# Patient Record
Sex: MALE | Race: WHITE | NOT HISPANIC OR LATINO
[De-identification: names, ages, dates, MRNs, and addresses within clinical notes are randomized per-mention and may not be internally consistent; named-entity substitution may affect disease eponyms.]

---

## 2021-03-18 PROBLEM — Z00.00 ENCOUNTER FOR PREVENTIVE HEALTH EXAMINATION: Status: ACTIVE | Noted: 2021-03-18

## 2021-03-19 ENCOUNTER — APPOINTMENT (OUTPATIENT)
Dept: ELECTROPHYSIOLOGY | Facility: CLINIC | Age: 56
End: 2021-03-19
Payer: MEDICARE

## 2021-03-19 DIAGNOSIS — T82.190A OTHER MECHANICAL COMPLICATION OF CARDIAC ELECTRODE, INITIAL ENCOUNTER: ICD-10-CM

## 2021-03-19 DIAGNOSIS — Z86.73 PERSONAL HISTORY OF TRANSIENT ISCHEMIC ATTACK (TIA), AND CEREBRAL INFARCTION W/OUT RESIDUAL DEFICITS: ICD-10-CM

## 2021-03-19 DIAGNOSIS — Z95.810 PRESENCE OF AUTOMATIC (IMPLANTABLE) CARDIAC DEFIBRILLATOR: ICD-10-CM

## 2021-03-19 DIAGNOSIS — I21.9 ACUTE MYOCARDIAL INFARCTION, UNSPECIFIED: ICD-10-CM

## 2021-03-19 DIAGNOSIS — I25.5 ISCHEMIC CARDIOMYOPATHY: ICD-10-CM

## 2021-03-19 PROCEDURE — 99203 OFFICE O/P NEW LOW 30 MIN: CPT | Mod: 95

## 2021-03-19 NOTE — REASON FOR VISIT
[Home] : at home, [unfilled] , at the time of the visit. [Medical Office: ([facility])___] : at the medical office located in  [Verbal consent obtained from patient] : the patient, [unfilled] [Initial Evaluation] : an initial evaluation of [FreeTextEntry1] : Ischemic CM and ICD lead fracture

## 2021-03-19 NOTE — HISTORY OF PRESENT ILLNESS
[FreeTextEntry1] : I had the pleasure of seeing your patient, Graham Walker today in the arrhythmia clinic of [facility] via telehealth.  As you well know the patient is a 56-year-old gentleman with a history of hyperlipidemia and homocystinemia premature coronary disease.  He suffered a large myocardial infarction at age 28 underwent a PCI.  Subsequently has myopathy and questionable heart failure.  In addition he has diabetes, TIAs and COPD.  In 2012 MUGA scan demonstrated an EF of 26% and he underwent placement of a single-chamber Medtronic primary prevention ICD.  Over the years on echocardiogram in 2014, 15, 17, 20 his ejection fraction has remained in the 30 to 35% range.  His most recent stress test in November 2020 demonstrated fixed anterior, apical and inferior defects without significant ischemia.  He suffered an ICD lead dislodgment and underwent extraction with reimplantation of a dual-chamber device in 2017.  The patient was septic gallbladder disease in October 2020 and received his first appropriate shocks from his device.  He was started on amiodarone at that time.  Follow-up his device was beeping and he was found to have an elevated of bipolar impedance of over 3000.  There was also some noise on the lead.  He was reconfigured to the tip to coil configuration and referred for potential extraction.\par \par Implanted hardware:\par ICD: Medtronic DVVE0D1, serial number [id number], implanted 6/16/2017\par ICD Lead: Medtronic 6935, serial number [id number]\par A lead; Medtronic 4076, serial number [id number]\par \par

## 2021-03-19 NOTE — DISCUSSION/SUMMARY
[FreeTextEntry1] : In summary the patient is a 56-year-old gentleman with premature coronary disease and ischemic cardiomyopathy status post ICD placement.  He now has an ICD lead fracture.The potential treatment options were reviewed at length.  If the vein were patent we discussed the option of implanting a new lead and abandoning malfunctioning lead.  Alternatively the device could be moved to the contralateral side with abandonment of the current system.  Lastly we discussed extraction and reimplantation.  The pros and cons of each of these approaches were discussed at length.  The procedure, outcomes and risks of extraction were discussed.  The risks discussed included, but were not limited to bleeding, infection, AV fistula, vascular tear, cardiac tear, valvular damage, need for emergent surgery and death.  After all questions were answered in a shared decision-making process the patient has decided to proceed with extraction and reimplantation.\par

## 2021-03-25 ENCOUNTER — NON-APPOINTMENT (OUTPATIENT)
Age: 56
End: 2021-03-25

## 2021-03-29 ENCOUNTER — TRANSCRIPTION ENCOUNTER (OUTPATIENT)
Age: 56
End: 2021-03-29

## 2021-03-29 ENCOUNTER — OUTPATIENT (OUTPATIENT)
Dept: OUTPATIENT SERVICES | Facility: HOSPITAL | Age: 56
LOS: 1 days | End: 2021-03-29
Payer: MEDICARE

## 2021-03-29 ENCOUNTER — OUTPATIENT (OUTPATIENT)
Dept: OUTPATIENT SERVICES | Facility: HOSPITAL | Age: 56
LOS: 1 days | End: 2021-03-29

## 2021-03-29 VITALS
DIASTOLIC BLOOD PRESSURE: 69 MMHG | RESPIRATION RATE: 20 BRPM | TEMPERATURE: 98 F | HEART RATE: 76 BPM | WEIGHT: 255.07 LBS | OXYGEN SATURATION: 96 % | SYSTOLIC BLOOD PRESSURE: 102 MMHG | HEIGHT: 66 IN

## 2021-03-29 VITALS
TEMPERATURE: 98 F | HEIGHT: 66 IN | DIASTOLIC BLOOD PRESSURE: 69 MMHG | OXYGEN SATURATION: 96 % | RESPIRATION RATE: 20 BRPM | WEIGHT: 255.07 LBS | SYSTOLIC BLOOD PRESSURE: 102 MMHG | HEART RATE: 76 BPM

## 2021-03-29 DIAGNOSIS — Z90.49 ACQUIRED ABSENCE OF OTHER SPECIFIED PARTS OF DIGESTIVE TRACT: Chronic | ICD-10-CM

## 2021-03-29 DIAGNOSIS — Z11.52 ENCOUNTER FOR SCREENING FOR COVID-19: ICD-10-CM

## 2021-03-29 DIAGNOSIS — E11.9 TYPE 2 DIABETES MELLITUS WITHOUT COMPLICATIONS: ICD-10-CM

## 2021-03-29 DIAGNOSIS — I47.0 RE-ENTRY VENTRICULAR ARRHYTHMIA: ICD-10-CM

## 2021-03-29 DIAGNOSIS — G47.33 OBSTRUCTIVE SLEEP APNEA (ADULT) (PEDIATRIC): ICD-10-CM

## 2021-03-29 DIAGNOSIS — T82.110A BREAKDOWN (MECHANICAL) OF CARDIAC ELECTRODE, INITIAL ENCOUNTER: ICD-10-CM

## 2021-03-29 DIAGNOSIS — I25.5 ISCHEMIC CARDIOMYOPATHY: ICD-10-CM

## 2021-03-29 DIAGNOSIS — Z98.890 OTHER SPECIFIED POSTPROCEDURAL STATES: Chronic | ICD-10-CM

## 2021-03-29 DIAGNOSIS — Z95.810 PRESENCE OF AUTOMATIC (IMPLANTABLE) CARDIAC DEFIBRILLATOR: Chronic | ICD-10-CM

## 2021-03-29 DIAGNOSIS — Z01.818 ENCOUNTER FOR OTHER PREPROCEDURAL EXAMINATION: ICD-10-CM

## 2021-03-29 LAB
ANION GAP SERPL CALC-SCNC: 14 MMOL/L — SIGNIFICANT CHANGE UP (ref 5–17)
BLD GP AB SCN SERPL QL: NEGATIVE — SIGNIFICANT CHANGE UP
BUN SERPL-MCNC: 24 MG/DL — HIGH (ref 7–23)
CALCIUM SERPL-MCNC: 9.4 MG/DL — SIGNIFICANT CHANGE UP (ref 8.4–10.5)
CHLORIDE SERPL-SCNC: 101 MMOL/L — SIGNIFICANT CHANGE UP (ref 96–108)
CO2 SERPL-SCNC: 23 MMOL/L — SIGNIFICANT CHANGE UP (ref 22–31)
CREAT SERPL-MCNC: 1.31 MG/DL — HIGH (ref 0.5–1.3)
GLUCOSE SERPL-MCNC: 120 MG/DL — HIGH (ref 70–99)
HCT VFR BLD CALC: 41.5 % — SIGNIFICANT CHANGE UP (ref 39–50)
HGB BLD-MCNC: 13.4 G/DL — SIGNIFICANT CHANGE UP (ref 13–17)
MCHC RBC-ENTMCNC: 27.6 PG — SIGNIFICANT CHANGE UP (ref 27–34)
MCHC RBC-ENTMCNC: 32.3 GM/DL — SIGNIFICANT CHANGE UP (ref 32–36)
MCV RBC AUTO: 85.6 FL — SIGNIFICANT CHANGE UP (ref 80–100)
NRBC # BLD: 0 /100 WBCS — SIGNIFICANT CHANGE UP (ref 0–0)
PLATELET # BLD AUTO: 173 K/UL — SIGNIFICANT CHANGE UP (ref 150–400)
POTASSIUM SERPL-MCNC: 3.7 MMOL/L — SIGNIFICANT CHANGE UP (ref 3.5–5.3)
POTASSIUM SERPL-SCNC: 3.7 MMOL/L — SIGNIFICANT CHANGE UP (ref 3.5–5.3)
RBC # BLD: 4.85 M/UL — SIGNIFICANT CHANGE UP (ref 4.2–5.8)
RBC # FLD: 14.5 % — SIGNIFICANT CHANGE UP (ref 10.3–14.5)
RH IG SCN BLD-IMP: NEGATIVE — SIGNIFICANT CHANGE UP
SARS-COV-2 RNA SPEC QL NAA+PROBE: DETECTED
SODIUM SERPL-SCNC: 138 MMOL/L — SIGNIFICANT CHANGE UP (ref 135–145)
WBC # BLD: 3.45 K/UL — LOW (ref 3.8–10.5)
WBC # FLD AUTO: 3.45 K/UL — LOW (ref 3.8–10.5)

## 2021-03-29 PROCEDURE — 71046 X-RAY EXAM CHEST 2 VIEWS: CPT | Mod: 26

## 2021-03-29 RX ORDER — CEFUROXIME AXETIL 250 MG
1500 TABLET ORAL ONCE
Refills: 0 | Status: DISCONTINUED | OUTPATIENT
Start: 2021-03-30 | End: 2021-04-01

## 2021-03-29 NOTE — H&P PST ADULT - HISTORY OF PRESENT ILLNESS
56-year-old gentleman poor historian  with a history of  Morbid obesity , HTN ,DM type 2, hyperlipidemia and homocystinemia , premature coronary disease,  myocardial infarction at age 28 underwent a PCI ( stents ), TIAs and COPD, cardiomyopathy , CHF s/p cardiac pacemaker in 2012 and  extraction with reimplantation of a dual-chamber device in 2017. Pt followed by Dr Rivers due malfunction of pacemaker  . Presents to PSt for scheduled   ICD lead Extraction with reimplant on 3/30/21.   covid test done 3/29/21    pt denies any covid symptoms ,no recent travel ,no covid exposure  56-year-old gentleman poor historian  with a history of  Morbid obesity , HTN ,DM type 2, hyperlipidemia and homocystinemia , premature coronary disease,  myocardial infarction at age 28 underwent a PCI ( stents ) on plavix , TIAs and COPD, cardiomyopathy , CHF s/p cardiac pacemaker in 2012 and  extraction with reimplantation of a dual-chamber device in 2017. Pt followed by Dr Rivers due malfunction of pacemaker . Presents to PST for scheduled  ICD lead Extraction with reimplant on 3/30/21.   covid test done 3/29/21   *** Pt stated that he staying over at the [facility] due to travelling issue and he didn't not bring any medication with him for tomorrow to take prior to surgery.  pt denies any covid symptoms ,no recent travel ,no covid exposure

## 2021-03-29 NOTE — H&P PST ADULT - ASSESSMENT
CAPRINI SCORE [CLOT updated 18]    AGE RELATED RISK FACTORS                                                       MOBILITY RELATED FACTORS  [x ] Age 41-60 years                                            (1 Point)                    [ ] Bed rest                                                        (1 Point)  [ ] Age: 61-74 years                                           (2 Points)                  [ ] Plaster cast                                                   (2 Points)  [ ] Age= 75 years                                              (3 Points)                    [ ] Bed bound for more than 72 hours                 (2 Points)    DISEASE RELATED RISK FACTORS                                               GENDER SPECIFIC FACTORS  [ x] Edema in the lower extremities                       (1 Point)              [ ] Pregnancy                                                     (1 Point)  [ x] Varicose veins                                               (1 Point)                     [ ] Post-partum < 6 weeks                                   (1 Point)             [ x] BMI > 25 Kg/m2                                            (1 Point)                     [ ] Hormonal therapy  or oral contraception          (1 Point)                 [ ] Sepsis (in the previous month)                        (1 Point)               [ ] History of pregnancy complications                 (1 point)  [ ] Pneumonia or serious lung disease                                               [ ] Unexplained or recurrent                     (1 Point)           (in the previous month)                               (1 Point)  [ ] Abnormal pulmonary function test                     (1 Point)                 SURGERY RELATED RISK FACTORS  [ ] Acute myocardial infarction                              (1 Point)               [ ]  Section                                             (1 Point)  [ ] Congestive heart failure (in the previous month)  (1 Point)      [ ] Minor surgery                                                  (1 Point)   [ ] Inflammatory bowel disease                             (1 Point)               [ ] Arthroscopic surgery                                        (2 Points)  [ ] Central venous access                                      (2 Points)                [ x] General surgery lasting more than 45 minutes (2 points)  [ ] Present or previous malignancy                     (2 Points)                [ ] Elective arthroplasty                                         (5 points)    [ ] Stroke (in the previous month)                          (5 Points)                                                                                                                                                           HEMATOLOGY RELATED FACTORS                                                 TRAUMA RELATED RISK FACTORS  [ ] Prior episodes of VTE                                     (3 Points)                [ ] Fracture of the hip, pelvis, or leg                       (5 Points)  [ ] Positive family history for VTE                         (3 Points)             [ ] Acute spinal cord injury (in the previous month)  (5 Points)  [ ] Prothrombin 48116 A                                     (3 Points)               [ ] Paralysis  (less than 1 month)                             (5 Points)  [ ] Factor V Leiden                                             (3 Points)                  [ ] Multiple Trauma within 1 month                        (5 Points)  [ ] Lupus anticoagulants                                     (3 Points)                                                           [ ] Anticardiolipin antibodies                               (3 Points)                                                       [ ] High homocysteine in the blood                      (3 Points)                                             [ ] Other congenital or acquired thrombophilia      (3 Points)                                                [ ] Heparin induced thrombocytopenia                  (3 Points)                                     Total Score [          ]

## 2021-03-29 NOTE — H&P PST ADULT - NS MD HP INPLANTS MED DEV
plates left elbow, hernia mesh/Automatic Implantable Cardioverter Defibrillator/Vascular stents/Clips

## 2021-03-29 NOTE — H&P PST ADULT - NSICDXPROBLEM_GEN_ALL_CORE_FT
PROBLEM DIAGNOSES  Problem: DM2 (diabetes mellitus, type 2)  Assessment and Plan:       R/O PROBLEM DIAGNOSES  Problem: DM2 (diabetes mellitus, type 2)  Assessment and Plan:      PROBLEM DIAGNOSES  Problem: Mechanical breakdown of cardiac electrode, initial encounter  Assessment and Plan: ICD Lead Extraction with Reimplant on 3/30/21   Pre- Op Instructions discussed   Labs sent   Covid test done   Cgest X ray done   pt will continue plavix     Problem: DEVYN (obstructive sleep apnea)  Assessment and Plan: OR booking notified   DEVYN precautions    Problem: DM2 (diabetes mellitus, type 2)  Assessment and Plan: HgA1c sent   no diabetic meds adx DOS   F/S the DOS       R/O PROBLEM DIAGNOSES  Problem: DM2 (diabetes mellitus, type 2)  Assessment and Plan:

## 2021-03-29 NOTE — H&P PST ADULT - NSANTHOSAYNRD_GEN_A_CORE
No. DEVYN screening performed.  STOP BANG Legend: 0-2 = LOW Risk; 3-4 = INTERMEDIATE Risk; 5-8 = HIGH Risk

## 2021-03-29 NOTE — H&P PST ADULT - NSICDXPASTMEDICALHX_GEN_ALL_CORE_FT
PAST MEDICAL HISTORY:  Cardiac pacemaker     DM2 (diabetes mellitus, type 2)     HLD (hyperlipidemia)     HTN (hypertension)     Morbid obesity      PAST MEDICAL HISTORY:  Cardiac pacemaker medtronic  Model [id number]    DM2 (diabetes mellitus, type 2)     H/O cardiomyopathy     History of COPD controlled with meds - denies any exacerbation    History of seizure last 7 years ago    HLD (hyperlipidemia)     HTN (hypertension)     Implantable cardioverter-defibrillator (ICD) in situ     Morbid obesity     Myocardial infarct

## 2021-03-30 ENCOUNTER — INPATIENT (INPATIENT)
Facility: HOSPITAL | Age: 56
LOS: 1 days | Discharge: HOME | DRG: 226 | End: 2021-04-01
Attending: HOSPITALIST | Admitting: INTERNAL MEDICINE
Payer: MEDICARE

## 2021-03-30 ENCOUNTER — TRANSCRIPTION ENCOUNTER (OUTPATIENT)
Age: 56
End: 2021-03-30

## 2021-03-30 VITALS
RESPIRATION RATE: 18 BRPM | HEART RATE: 89 BPM | OXYGEN SATURATION: 98 % | DIASTOLIC BLOOD PRESSURE: 100 MMHG | TEMPERATURE: 98 F | HEIGHT: 66 IN | WEIGHT: 259.7 LBS | SYSTOLIC BLOOD PRESSURE: 150 MMHG

## 2021-03-30 DIAGNOSIS — T82.110A BREAKDOWN (MECHANICAL) OF CARDIAC ELECTRODE, INITIAL ENCOUNTER: ICD-10-CM

## 2021-03-30 DIAGNOSIS — Z95.810 PRESENCE OF AUTOMATIC (IMPLANTABLE) CARDIAC DEFIBRILLATOR: Chronic | ICD-10-CM

## 2021-03-30 DIAGNOSIS — Z98.890 OTHER SPECIFIED POSTPROCEDURAL STATES: Chronic | ICD-10-CM

## 2021-03-30 DIAGNOSIS — I25.5 ISCHEMIC CARDIOMYOPATHY: ICD-10-CM

## 2021-03-30 DIAGNOSIS — Z90.49 ACQUIRED ABSENCE OF OTHER SPECIFIED PARTS OF DIGESTIVE TRACT: Chronic | ICD-10-CM

## 2021-03-30 DIAGNOSIS — R09.89 OTHER SPECIFIED SYMPTOMS AND SIGNS INVOLVING THE CIRCULATORY AND RESPIRATORY SYSTEMS: ICD-10-CM

## 2021-03-30 LAB
A1C WITH ESTIMATED AVERAGE GLUCOSE RESULT: 6.6 % — HIGH (ref 4–5.6)
CRP SERPL-MCNC: 17 MG/L — HIGH (ref 0–4)
D DIMER BLD IA.RAPID-MCNC: 804 NG/ML DDU — HIGH
ESTIMATED AVERAGE GLUCOSE: 143 MG/DL — HIGH (ref 68–114)
RH IG SCN BLD-IMP: NEGATIVE — SIGNIFICANT CHANGE UP

## 2021-03-30 PROCEDURE — 33241 REMOVE PULSE GENERATOR: CPT

## 2021-03-30 PROCEDURE — 71045 X-RAY EXAM CHEST 1 VIEW: CPT | Mod: 26

## 2021-03-30 PROCEDURE — 33244 REMOVE ELCTRD TRANSVENOUSLY: CPT

## 2021-03-30 PROCEDURE — 99222 1ST HOSP IP/OBS MODERATE 55: CPT | Mod: CS

## 2021-03-30 PROCEDURE — 99233 SBSQ HOSP IP/OBS HIGH 50: CPT | Mod: 25

## 2021-03-30 PROCEDURE — 33249 INSJ/RPLCMT DEFIB W/LEAD(S): CPT

## 2021-03-30 PROCEDURE — 93010 ELECTROCARDIOGRAM REPORT: CPT

## 2021-03-30 PROCEDURE — 99223 1ST HOSP IP/OBS HIGH 75: CPT | Mod: CS

## 2021-03-30 RX ORDER — PANTOPRAZOLE SODIUM 20 MG/1
40 TABLET, DELAYED RELEASE ORAL ONCE
Refills: 0 | Status: COMPLETED | OUTPATIENT
Start: 2021-03-30 | End: 2021-03-30

## 2021-03-30 RX ORDER — LEVETIRACETAM 250 MG/1
750 TABLET, FILM COATED ORAL
Refills: 0 | Status: DISCONTINUED | OUTPATIENT
Start: 2021-03-30 | End: 2021-04-01

## 2021-03-30 RX ORDER — SODIUM CHLORIDE 9 MG/ML
250 INJECTION INTRAMUSCULAR; INTRAVENOUS; SUBCUTANEOUS
Refills: 0 | Status: COMPLETED | OUTPATIENT
Start: 2021-03-30 | End: 2021-03-30

## 2021-03-30 RX ORDER — LIDOCAINE HCL 20 MG/ML
0.2 VIAL (ML) INJECTION ONCE
Refills: 0 | Status: DISCONTINUED | OUTPATIENT
Start: 2021-03-30 | End: 2021-03-30

## 2021-03-30 RX ORDER — HYDROMORPHONE HYDROCHLORIDE 2 MG/ML
0.5 INJECTION INTRAMUSCULAR; INTRAVENOUS; SUBCUTANEOUS
Refills: 0 | Status: DISCONTINUED | OUTPATIENT
Start: 2021-03-30 | End: 2021-04-01

## 2021-03-30 RX ORDER — INSULIN GLARGINE 100 [IU]/ML
48 INJECTION, SOLUTION SUBCUTANEOUS AT BEDTIME
Refills: 0 | Status: DISCONTINUED | OUTPATIENT
Start: 2021-03-30 | End: 2021-04-01

## 2021-03-30 RX ORDER — CEFAZOLIN SODIUM 1 G
2000 VIAL (EA) INJECTION ONCE
Refills: 0 | Status: COMPLETED | OUTPATIENT
Start: 2021-03-30 | End: 2021-03-30

## 2021-03-30 RX ORDER — ATORVASTATIN CALCIUM 80 MG/1
80 TABLET, FILM COATED ORAL AT BEDTIME
Refills: 0 | Status: DISCONTINUED | OUTPATIENT
Start: 2021-03-30 | End: 2021-04-01

## 2021-03-30 RX ORDER — HYDROMORPHONE HYDROCHLORIDE 2 MG/ML
1 INJECTION INTRAMUSCULAR; INTRAVENOUS; SUBCUTANEOUS
Refills: 0 | Status: DISCONTINUED | OUTPATIENT
Start: 2021-03-30 | End: 2021-04-01

## 2021-03-30 RX ORDER — CHLORHEXIDINE GLUCONATE 213 G/1000ML
1 SOLUTION TOPICAL ONCE
Refills: 0 | Status: DISCONTINUED | OUTPATIENT
Start: 2021-03-30 | End: 2021-03-30

## 2021-03-30 RX ORDER — SODIUM CHLORIDE 9 MG/ML
3 INJECTION INTRAMUSCULAR; INTRAVENOUS; SUBCUTANEOUS EVERY 8 HOURS
Refills: 0 | Status: DISCONTINUED | OUTPATIENT
Start: 2021-03-30 | End: 2021-03-30

## 2021-03-30 RX ORDER — CARVEDILOL PHOSPHATE 80 MG/1
12.5 CAPSULE, EXTENDED RELEASE ORAL EVERY 12 HOURS
Refills: 0 | Status: DISCONTINUED | OUTPATIENT
Start: 2021-03-30 | End: 2021-04-01

## 2021-03-30 RX ADMIN — HYDROMORPHONE HYDROCHLORIDE 1 MILLIGRAM(S): 2 INJECTION INTRAMUSCULAR; INTRAVENOUS; SUBCUTANEOUS at 17:16

## 2021-03-30 RX ADMIN — LEVETIRACETAM 750 MILLIGRAM(S): 250 TABLET, FILM COATED ORAL at 23:57

## 2021-03-30 RX ADMIN — PANTOPRAZOLE SODIUM 40 MILLIGRAM(S): 20 TABLET, DELAYED RELEASE ORAL at 23:56

## 2021-03-30 RX ADMIN — SODIUM CHLORIDE 25 MILLILITER(S): 9 INJECTION INTRAMUSCULAR; INTRAVENOUS; SUBCUTANEOUS at 15:47

## 2021-03-30 RX ADMIN — SODIUM CHLORIDE 3 MILLILITER(S): 9 INJECTION INTRAMUSCULAR; INTRAVENOUS; SUBCUTANEOUS at 07:52

## 2021-03-30 RX ADMIN — Medication 100 MILLIGRAM(S): at 17:17

## 2021-03-30 RX ADMIN — ATORVASTATIN CALCIUM 80 MILLIGRAM(S): 80 TABLET, FILM COATED ORAL at 23:57

## 2021-03-30 NOTE — H&P ADULT - HISTORY OF PRESENT ILLNESS
56M poor historian w/ pmhx Morbid obesity (BMI= 41.9), HTN, DM type 2 3/30 HgbA1C= 6.6), hyperlipidemia and homocystinemia , premature coronary disease,  myocardial infarction at age 28 s/p MICHELLE on DAPT, TIAs and COPD, CHF s/p cardiac pacemaker in 2012 and  extraction with reimplantation of a dual-chamber device in 2017. Pt followed by Dr Rivers due malfunction of pacemaker . Presents to PST for scheduled ICD lead Extraction with reimplant on 3/30/21. Pt now with replacement with medtronic dual chamber ICD. Covid test done 3/29/21. Pt endorses some worsened SOB compared to baseline when he is reclined recently. Also endorses diarrhea for past 3-4 days. He states he has not travelled to any other areas. States his mother and sister were diagnosed with COVID recently but have not been following isolation precautions at home. Pt also endorses getting first pfizer shot last week. Denies fevers, chills, cough or chest pain. He is very frustrated at the course of today's events. Received COVID antibody medication earlier today.

## 2021-03-30 NOTE — CONSULT NOTE ADULT - SUBJECTIVE AND OBJECTIVE BOX
Patient is a 56y old  Male who presents with a chief complaint of pace maker implantation (29 Mar 2021 16:55)    HPI:  56-year-old gentleman poor historian  with a history of  Morbid obesity (BMI= 41.9) , HTN ,DM type 2 3/30 HgbA1C= 6.6), hyperlipidemia and homocystinemia , premature coronary disease,  myocardial infarction at age 28 underwent a PCI ( stents ) on plavix , TIAs and COPD, cardiomyopathy , CHF s/p cardiac pacemaker in 2012 and  extraction with reimplantation of a dual-chamber device in 2017. Pt followed by Dr Rivers due malfunction of pacemaker . Presents to PST for scheduled  ICD lead Extraction with reimplant on 3/30/21.   covid test done 3/29/21   *** Pt stated that he staying over at the [facility] due to travelling issue and he didn't not bring any medication with him for tomorrow to take prior to surgery.  pt denies any covid symptoms ,no recent travel ,no covid exposure  (29 Mar 2021 16:55)  3/30: earlier today he underwent: ICD/Lead extraction, Reimplantation of a dual chamber ICD  He was transferred from PICU to . He was upset about his belongings and declined to answer questions      PAST MEDICAL & SURGICAL HISTORY:  DM2 (diabetes mellitus, type 2)    HTN (hypertension)    HLD (hyperlipidemia)    Morbid obesity    Cardiac pacemaker  medtronic  Model [id number]    History of seizure  last 7 years ago    H/O cardiomyopathy    Implantable cardioverter-defibrillator (ICD) in situ    Myocardial infarct    History of COPD  controlled with meds - denies any exacerbation    H/O elbow surgery  left    AICD (automatic cardioverter/defibrillator) present    History of hernia surgery    S/P laparoscopic cholecystectomy  10/2020    Social history: single, no tobacco    FAMILY HISTORY:   declines to answer  REVIEW OF SYSTEMS: declines to answer      Allergies  No Known Allergies    Antimicrobials:  ceFAZolin   IVPB 2000 milliGRAM(s) IV Intermittent once  cefuroxime  IVPB 1500 milliGRAM(s) IV Intermittent once      Vital Signs Last 24 Hrs  T(C): 36.5 (30 Mar 2021 13:36), Max: 36.8 (29 Mar 2021 16:55)  T(F): 97.7 (30 Mar 2021 13:36), Max: 98.3 (29 Mar 2021 16:55)  HR: 57 (30 Mar 2021 13:36) (53 - 89)  BP: 105/65 (30 Mar 2021 13:36) (102/69 - 150/100)  BP(mean): 77 (30 Mar 2021 12:30) (75 - 82)  RR: 18 (30 Mar 2021 13:36) (15 - 21)  SpO2: 96% (30 Mar 2021 13:36) (96% - 100%) ROOM AIR    PHYSICAL EXAM:  General: WN/WD NAD, Non-toxic  Neurology: Alert, angry  Respiratory: no resp distress, comfortable on room air  CV: RRR, S1S2, no murmurs, rubs or gallops  Abdominal: Soft, Non-tender, non-distended, normal bowel sounds  Extremities: No edema, + peripheral pulses  Line Sites: Clear  Skin: No rash                        13.4   3.45  )-----------( 173      ( 29 Mar 2021 19:55 )             41.5     03-29    138  |  101  |  24<H>  ----------------------------<  120<H>  3.7   |  23  |  1.31<H>    Ca    9.4      29 Mar 2021 19:55      (03.29.21 @ 12:40)  COVID-19 PCR: Detected    Radiology:  < from: Xray Chest 2 Views PA/Lat (03.29.21 @ 18:18) >  The heart is normal in size. Lungs are clear. No pleural effusion. No pneumothorax. A pacer is in good position.    < end of copied text >      Alfredo Boyle MD; Division of Infectious Disease; Pager: [phone]; nights and weekends: [phone]

## 2021-03-30 NOTE — H&P ADULT - PROBLEM SELECTOR PLAN 5
S/p MICHELLE. Case discussed with cardiology fellow overnight. Will cont. DAPT  -Cont. asa  -Cont. plavix  -Cont. atorvastatin

## 2021-03-30 NOTE — CONSULT NOTE ADULT - ASSESSMENT
56M with malfunction of PPM  Medical history includes: Morbid obesity (BMI= 41.9) , HTN ,DM type 2 (3/30 HgbA1C= 6.6), hyperlipidemia and homocystinemia , premature coronary disease,  myocardial infarction at age 28 underwent a PCI ( stents ) on plavix , TIAs and COPD, cardiomyopathy , CHF s/p cardiac pacemaker in 2012 and  extraction with reimplantation of a dual-chamber device in 2017.   3/30t: ICD/Lead extraction, Reimplantation of a dual chamber ICD  Tested +COVID  He is asymptomatic, comfortable and oxygenating well on room air.  His comorbidities of cardiovascular disease, DM, HTN and obesity pose increase risk of complications    Plan  check COVID nucleocapsid antibody    monoclonal antibody cocktail has been ordered  isolation  monitor oxygenation (not currently candidate for Remdisivir)  check inflammatory markers    discussed with staff who are follow up on his belongings

## 2021-03-30 NOTE — H&P ADULT - NSHPPHYSICALEXAM_GEN_ALL_CORE
Vital Signs Last 24 Hrs  T(C): 36.5 (03-30-21 @ 21:02), Max: 36.6 (03-30-21 @ 07:52)  T(F): 97.7 (03-30-21 @ 21:02), Max: 97.9 (03-30-21 @ 07:52)  HR: 51 (03-30-21 @ 21:02) (51 - 89)  BP: 109/69 (03-30-21 @ 21:02) (101/66 - 150/100)  BP(mean): 77 (03-30-21 @ 12:30) (75 - 82)  RR: 18 (03-30-21 @ 21:02) (15 - 21)  SpO2: 97% (03-30-21 @ 21:02) (95% - 100%)

## 2021-03-30 NOTE — H&P ADULT - PROBLEM SELECTOR PLAN 3
On Toujeo 60U QHS and humalog 16U-28U TIDAC  -Cont. lantus 48U QHS for now given change in diet inpatient, low glucose levels in AM  -Fingersticks and sliding scale for now, add on home humalog as needed/ based on diet

## 2021-03-30 NOTE — CHART NOTE - NSCHARTNOTEFT_GEN_A_CORE
BRIEF PROCEDURE NOTE    JOSEPH VEGA  [id number]    Pre-op Diagnosis: Fractured ICD Lead    Post-op Diagnosis: Same    Procedure: ICD/Lead extraction, Reimplantation of a dual chamber ICD    Electrophysiologist: Sherlyn Concepcion MD    Fellow: Kaylen Adams MD    Anesthesia: GA    Access: left axillary vein    Description:    Local with 0.5% Marcaine to left infraclavicular area  ICD pocket opened/ICD removed  Leads freed from scar in pocket, anchors removed  Active fixation retracted, Atrial lead removed with gentle traction.  ICD lead cut, Locking style placed distally and ICD lead removed with gentle traction.     Left venogram and access via left axillary puncture  New dual chamber ICD implanted. Tyrex pouch used. Fibrillar used to achieve pocket hemostasis.  Pocket closed in 3 layers.     Complications: none    EBL: 10cc    Disposition: to recovery/stable    Plan:  Stat CXR  PA/Lat in AM  ECG  NO HEPARIN/LOVINOX

## 2021-03-30 NOTE — H&P ADULT - ASSESSMENT
56M poor historian w/ pmhx Morbid obesity (BMI= 41.9), HTN, DM type 2 3/30 HgbA1C= 6.6), hyperlipidemia and homocystinemia , premature coronary disease,  myocardial infarction at age 28 s/p MICHELLE on DAPT, TIAs and COPD, CHF s/p cardiac pacemaker in 2012 and  extraction with reimplantation of a dual-chamber device in 2017 p/w ICD exchange today due to fractured lead. Incidentally found to be COVID positive.

## 2021-03-30 NOTE — H&P ADULT - PROBLEM SELECTOR PLAN 2
S/p removal and reimplantation of ICD with dual chamber medtronic ICD. Appreciate EP fellow note.  -AM EKG  -AM CXR PA/LAT  -Holding lovenox  -Telemetry  -F/u EP recommendations

## 2021-03-30 NOTE — H&P ADULT - NSICDXPASTMEDICALHX_GEN_ALL_CORE_FT
PAST MEDICAL HISTORY:  Cardiac pacemaker medtronic  Model [id number]    DM2 (diabetes mellitus, type 2)     H/O cardiomyopathy     History of COPD controlled with meds - denies any exacerbation    History of seizure last 7 years ago    HLD (hyperlipidemia)     HTN (hypertension)     Implantable cardioverter-defibrillator (ICD) in situ     Morbid obesity     Myocardial infarct

## 2021-03-30 NOTE — H&P ADULT - PROBLEM SELECTOR PLAN 1
Appreciate ID recommendations. S/p monoclonal antibody therapy. Current oxygenation not justifying Remdesivir  -F/u inflammatory markers  -F/u antibody results  -F/u ID recommendations  -Cont. isolation precautions

## 2021-03-30 NOTE — H&P ADULT - NSICDXPASTSURGICALHX_GEN_ALL_CORE_FT
PAST SURGICAL HISTORY:  AICD (automatic cardioverter/defibrillator) present     H/O elbow surgery left    History of hernia surgery     S/P laparoscopic cholecystectomy 10/2020

## 2021-03-31 ENCOUNTER — TRANSCRIPTION ENCOUNTER (OUTPATIENT)
Age: 56
End: 2021-03-31

## 2021-03-31 DIAGNOSIS — Z87.898 PERSONAL HISTORY OF OTHER SPECIFIED CONDITIONS: ICD-10-CM

## 2021-03-31 DIAGNOSIS — Z87.09 PERSONAL HISTORY OF OTHER DISEASES OF THE RESPIRATORY SYSTEM: ICD-10-CM

## 2021-03-31 DIAGNOSIS — E66.01 MORBID (SEVERE) OBESITY DUE TO EXCESS CALORIES: ICD-10-CM

## 2021-03-31 DIAGNOSIS — E11.69 TYPE 2 DIABETES MELLITUS WITH OTHER SPECIFIED COMPLICATION: ICD-10-CM

## 2021-03-31 DIAGNOSIS — I10 ESSENTIAL (PRIMARY) HYPERTENSION: ICD-10-CM

## 2021-03-31 DIAGNOSIS — U07.1 COVID-19: ICD-10-CM

## 2021-03-31 DIAGNOSIS — I50.20 UNSPECIFIED SYSTOLIC (CONGESTIVE) HEART FAILURE: ICD-10-CM

## 2021-03-31 DIAGNOSIS — T82.110A BREAKDOWN (MECHANICAL) OF CARDIAC ELECTRODE, INITIAL ENCOUNTER: ICD-10-CM

## 2021-03-31 DIAGNOSIS — I25.10 ATHEROSCLEROTIC HEART DISEASE OF NATIVE CORONARY ARTERY WITHOUT ANGINA PECTORIS: ICD-10-CM

## 2021-03-31 DIAGNOSIS — Z29.9 ENCOUNTER FOR PROPHYLACTIC MEASURES, UNSPECIFIED: ICD-10-CM

## 2021-03-31 LAB
A1C WITH ESTIMATED AVERAGE GLUCOSE RESULT: 6.7 % — HIGH (ref 4–5.6)
COVID-19 NUCLEOCAPSID GAM AB INTERP: NEGATIVE — SIGNIFICANT CHANGE UP
COVID-19 NUCLEOCAPSID TOTAL GAM ANTIBODY RESULT: 0.37 INDEX — SIGNIFICANT CHANGE UP
CRP SERPL-MCNC: 14 MG/L — HIGH (ref 0–4)
ERYTHROCYTE [SEDIMENTATION RATE] IN BLOOD: 11 MM/HR — SIGNIFICANT CHANGE UP (ref 0–20)
ESTIMATED AVERAGE GLUCOSE: 146 MG/DL — HIGH (ref 68–114)
FERRITIN SERPL-MCNC: 454 NG/ML — HIGH (ref 30–400)
GLUCOSE BLDC GLUCOMTR-MCNC: 172 MG/DL — HIGH (ref 70–99)
GLUCOSE BLDC GLUCOMTR-MCNC: 203 MG/DL — HIGH (ref 70–99)
GLUCOSE BLDC GLUCOMTR-MCNC: 218 MG/DL — HIGH (ref 70–99)
HCV AB S/CO SERPL IA: 0.22 S/CO — SIGNIFICANT CHANGE UP (ref 0–0.99)
HCV AB SERPL-IMP: SIGNIFICANT CHANGE UP
SARS-COV-2 IGG+IGM SERPL QL IA: 0.37 INDEX — SIGNIFICANT CHANGE UP
SARS-COV-2 IGG+IGM SERPL QL IA: NEGATIVE — SIGNIFICANT CHANGE UP

## 2021-03-31 PROCEDURE — 99233 SBSQ HOSP IP/OBS HIGH 50: CPT | Mod: CS

## 2021-03-31 PROCEDURE — 71046 X-RAY EXAM CHEST 2 VIEWS: CPT | Mod: 26

## 2021-03-31 PROCEDURE — 99024 POSTOP FOLLOW-UP VISIT: CPT

## 2021-03-31 PROCEDURE — 93010 ELECTROCARDIOGRAM REPORT: CPT

## 2021-03-31 PROCEDURE — 99232 SBSQ HOSP IP/OBS MODERATE 35: CPT | Mod: CS

## 2021-03-31 RX ORDER — FUROSEMIDE 40 MG
1 TABLET ORAL
Qty: 0 | Refills: 0 | DISCHARGE

## 2021-03-31 RX ORDER — INSULIN LISPRO 100/ML
VIAL (ML) SUBCUTANEOUS
Refills: 0 | Status: DISCONTINUED | OUTPATIENT
Start: 2021-03-31 | End: 2021-04-01

## 2021-03-31 RX ORDER — FLUTICASONE PROPIONATE AND SALMETEROL 50; 250 UG/1; UG/1
1 POWDER ORAL; RESPIRATORY (INHALATION)
Qty: 0 | Refills: 0 | DISCHARGE

## 2021-03-31 RX ORDER — DULAGLUTIDE 4.5 MG/.5ML
0 INJECTION, SOLUTION SUBCUTANEOUS
Qty: 0 | Refills: 0 | DISCHARGE

## 2021-03-31 RX ORDER — DEXTROSE 50 % IN WATER 50 %
12.5 SYRINGE (ML) INTRAVENOUS ONCE
Refills: 0 | Status: DISCONTINUED | OUTPATIENT
Start: 2021-03-31 | End: 2021-04-01

## 2021-03-31 RX ORDER — PANTOPRAZOLE SODIUM 20 MG/1
40 TABLET, DELAYED RELEASE ORAL
Refills: 0 | Status: DISCONTINUED | OUTPATIENT
Start: 2021-03-31 | End: 2021-04-01

## 2021-03-31 RX ORDER — SODIUM CHLORIDE 9 MG/ML
1000 INJECTION, SOLUTION INTRAVENOUS
Refills: 0 | Status: DISCONTINUED | OUTPATIENT
Start: 2021-03-31 | End: 2021-04-01

## 2021-03-31 RX ORDER — NYSTATIN/TRIAMCINOLONE ACET
1 OINTMENT (GRAM) TOPICAL
Qty: 0 | Refills: 0 | DISCHARGE

## 2021-03-31 RX ORDER — CARVEDILOL PHOSPHATE 80 MG/1
1 CAPSULE, EXTENDED RELEASE ORAL
Qty: 0 | Refills: 0 | DISCHARGE

## 2021-03-31 RX ORDER — CHOLECALCIFEROL (VITAMIN D3) 125 MCG
1000 CAPSULE ORAL AT BEDTIME
Refills: 0 | Status: DISCONTINUED | OUTPATIENT
Start: 2021-03-31 | End: 2021-04-01

## 2021-03-31 RX ORDER — FUROSEMIDE 40 MG
40 TABLET ORAL DAILY
Refills: 0 | Status: DISCONTINUED | OUTPATIENT
Start: 2021-03-31 | End: 2021-04-01

## 2021-03-31 RX ORDER — ASCORBIC ACID 60 MG
1000 TABLET,CHEWABLE ORAL DAILY
Refills: 0 | Status: DISCONTINUED | OUTPATIENT
Start: 2021-03-31 | End: 2021-04-01

## 2021-03-31 RX ORDER — ASCORBIC ACID 60 MG
1 TABLET,CHEWABLE ORAL
Qty: 0 | Refills: 0 | DISCHARGE

## 2021-03-31 RX ORDER — TIOTROPIUM BROMIDE 18 UG/1
1 CAPSULE ORAL; RESPIRATORY (INHALATION) DAILY
Refills: 0 | Status: DISCONTINUED | OUTPATIENT
Start: 2021-03-31 | End: 2021-04-01

## 2021-03-31 RX ORDER — LOSARTAN POTASSIUM 100 MG/1
1 TABLET, FILM COATED ORAL
Qty: 0 | Refills: 0 | DISCHARGE

## 2021-03-31 RX ORDER — CHOLECALCIFEROL (VITAMIN D3) 125 MCG
1 CAPSULE ORAL
Qty: 0 | Refills: 0 | DISCHARGE

## 2021-03-31 RX ORDER — DEXLANSOPRAZOLE 30 MG/1
1 CAPSULE, DELAYED RELEASE ORAL
Qty: 0 | Refills: 0 | DISCHARGE

## 2021-03-31 RX ORDER — DEXTROSE 50 % IN WATER 50 %
25 SYRINGE (ML) INTRAVENOUS ONCE
Refills: 0 | Status: DISCONTINUED | OUTPATIENT
Start: 2021-03-31 | End: 2021-04-01

## 2021-03-31 RX ORDER — INSULIN LISPRO 100/ML
VIAL (ML) SUBCUTANEOUS AT BEDTIME
Refills: 0 | Status: DISCONTINUED | OUTPATIENT
Start: 2021-03-31 | End: 2021-04-01

## 2021-03-31 RX ORDER — CLOPIDOGREL BISULFATE 75 MG/1
1 TABLET, FILM COATED ORAL
Qty: 0 | Refills: 0 | DISCHARGE

## 2021-03-31 RX ORDER — ALBUTEROL 90 UG/1
2 AEROSOL, METERED ORAL
Qty: 0 | Refills: 0 | DISCHARGE

## 2021-03-31 RX ORDER — ASPIRIN/CALCIUM CARB/MAGNESIUM 324 MG
1 TABLET ORAL
Qty: 0 | Refills: 0 | DISCHARGE

## 2021-03-31 RX ORDER — ACETAMINOPHEN 500 MG
650 TABLET ORAL EVERY 6 HOURS
Refills: 0 | Status: DISCONTINUED | OUTPATIENT
Start: 2021-03-31 | End: 2021-04-01

## 2021-03-31 RX ORDER — LOSARTAN POTASSIUM 100 MG/1
25 TABLET, FILM COATED ORAL DAILY
Refills: 0 | Status: DISCONTINUED | OUTPATIENT
Start: 2021-03-31 | End: 2021-04-01

## 2021-03-31 RX ORDER — GLUCAGON INJECTION, SOLUTION 0.5 MG/.1ML
1 INJECTION, SOLUTION SUBCUTANEOUS ONCE
Refills: 0 | Status: DISCONTINUED | OUTPATIENT
Start: 2021-03-31 | End: 2021-04-01

## 2021-03-31 RX ORDER — DEXTROSE 50 % IN WATER 50 %
15 SYRINGE (ML) INTRAVENOUS ONCE
Refills: 0 | Status: DISCONTINUED | OUTPATIENT
Start: 2021-03-31 | End: 2021-04-01

## 2021-03-31 RX ORDER — PREGABALIN 225 MG/1
1 CAPSULE ORAL
Qty: 0 | Refills: 0 | DISCHARGE

## 2021-03-31 RX ORDER — TIOTROPIUM BROMIDE 18 UG/1
2 CAPSULE ORAL; RESPIRATORY (INHALATION)
Qty: 0 | Refills: 0 | DISCHARGE

## 2021-03-31 RX ORDER — CLOPIDOGREL BISULFATE 75 MG/1
75 TABLET, FILM COATED ORAL DAILY
Refills: 0 | Status: DISCONTINUED | OUTPATIENT
Start: 2021-03-31 | End: 2021-04-01

## 2021-03-31 RX ORDER — OMEGA-3 ACID ETHYL ESTERS 1 G
1 CAPSULE ORAL
Qty: 0 | Refills: 0 | DISCHARGE

## 2021-03-31 RX ORDER — INSULIN LISPRO 100/ML
0 VIAL (ML) SUBCUTANEOUS
Qty: 0 | Refills: 0 | DISCHARGE

## 2021-03-31 RX ORDER — ATORVASTATIN CALCIUM 80 MG/1
1 TABLET, FILM COATED ORAL
Qty: 0 | Refills: 0 | DISCHARGE

## 2021-03-31 RX ORDER — LEVETIRACETAM 250 MG/1
1 TABLET, FILM COATED ORAL
Qty: 0 | Refills: 0 | DISCHARGE

## 2021-03-31 RX ORDER — PREGABALIN 225 MG/1
1000 CAPSULE ORAL DAILY
Refills: 0 | Status: DISCONTINUED | OUTPATIENT
Start: 2021-03-31 | End: 2021-04-01

## 2021-03-31 RX ORDER — FENOFIBRATE,MICRONIZED 130 MG
1 CAPSULE ORAL
Qty: 0 | Refills: 0 | DISCHARGE

## 2021-03-31 RX ORDER — INSULIN GLARGINE 100 [IU]/ML
60 INJECTION, SOLUTION SUBCUTANEOUS
Qty: 0 | Refills: 0 | DISCHARGE

## 2021-03-31 RX ORDER — FOLIC ACID 0.8 MG
1 TABLET ORAL
Qty: 0 | Refills: 0 | DISCHARGE

## 2021-03-31 RX ADMIN — Medication 2: at 08:47

## 2021-03-31 RX ADMIN — HYDROMORPHONE HYDROCHLORIDE 1 MILLIGRAM(S): 2 INJECTION INTRAMUSCULAR; INTRAVENOUS; SUBCUTANEOUS at 22:07

## 2021-03-31 RX ADMIN — INSULIN GLARGINE 48 UNIT(S): 100 INJECTION, SOLUTION SUBCUTANEOUS at 00:23

## 2021-03-31 RX ADMIN — Medication 40 MILLIGRAM(S): at 05:15

## 2021-03-31 RX ADMIN — Medication 1000 MILLIGRAM(S): at 12:39

## 2021-03-31 RX ADMIN — CLOPIDOGREL BISULFATE 75 MILLIGRAM(S): 75 TABLET, FILM COATED ORAL at 12:38

## 2021-03-31 RX ADMIN — HYDROMORPHONE HYDROCHLORIDE 1 MILLIGRAM(S): 2 INJECTION INTRAMUSCULAR; INTRAVENOUS; SUBCUTANEOUS at 21:28

## 2021-03-31 RX ADMIN — INSULIN GLARGINE 48 UNIT(S): 100 INJECTION, SOLUTION SUBCUTANEOUS at 21:27

## 2021-03-31 RX ADMIN — Medication 4: at 13:03

## 2021-03-31 RX ADMIN — Medication 2: at 18:02

## 2021-03-31 RX ADMIN — LEVETIRACETAM 750 MILLIGRAM(S): 250 TABLET, FILM COATED ORAL at 05:15

## 2021-03-31 RX ADMIN — Medication 1000 UNIT(S): at 20:05

## 2021-03-31 RX ADMIN — ATORVASTATIN CALCIUM 80 MILLIGRAM(S): 80 TABLET, FILM COATED ORAL at 20:05

## 2021-03-31 RX ADMIN — PREGABALIN 1000 MICROGRAM(S): 225 CAPSULE ORAL at 12:39

## 2021-03-31 RX ADMIN — LOSARTAN POTASSIUM 25 MILLIGRAM(S): 100 TABLET, FILM COATED ORAL at 05:15

## 2021-03-31 RX ADMIN — LEVETIRACETAM 750 MILLIGRAM(S): 250 TABLET, FILM COATED ORAL at 17:26

## 2021-03-31 RX ADMIN — PANTOPRAZOLE SODIUM 40 MILLIGRAM(S): 20 TABLET, DELAYED RELEASE ORAL at 05:37

## 2021-03-31 RX ADMIN — Medication 1 TABLET(S): at 12:38

## 2021-03-31 NOTE — PROGRESS NOTE ADULT - ASSESSMENT
56 year old male with PMHx of morbid obesity, CHF s/p PPM, HTN, T2DM, HLD, CAD s/p PCI, MI, COPD admitted with lead fracture requiring extraction and reimplantation.    1. Lead fracture    - S/p lead extraction and subsequent reimplant  - CXR with pacer/wire in position  - Patient lives 3 hours away and will be following up with his MD at home (patient provided with clinic phone number for any further questions)    Valerie Kennedy PA-C  #[phone] 56 year old male with PMHx of morbid obesity, CHF s/p PPM, HTN, T2DM, HLD, CAD s/p PCI, MI, COPD admitted with lead fracture requiring extraction and reimplantation.    1. Lead fracture    - S/p lead extraction and subsequent reimplant  - CXR with pacer/wire in position  - Device interrogated and paired by [employer] rep. Device with normal function.  - Bedside education provided  - Patient lives 3 hours away and will be following up with his MD at home (patient provided with clinic phone number for any further questions)    Valerie Kennedy PA-C  #[phone] 56 year old male with PMHx of morbid obesity, CHF s/p PPM, HTN, T2DM, HLD, CAD s/p PCI, MI, COPD admitted with lead fracture requiring extraction and reimplantation.    1. Lead fracture    - S/p lead extraction and subsequent reimplant  - CXR with pacer/wire in position  - Device interrogated and paired by [employer] rep. Device with normal function.  - Bedside education provided  - Patient lives 3 hours away and will be following up with his MD at home (patient provided with clinic phone number for any further questions)  - Cleared for discharge from EP perspective    Valerie Kennedy PA-C  #[phone] 56 year old male with PMHx of morbid obesity, CHF s/p PPM, HTN, T2DM, HLD, CAD s/p PCI, MI, COPD admitted with lead fracture requiring extraction and reimplantation.    1. Lead fracture    - S/p lead extraction and subsequent reimplant on 3/30  - CXR with pacer/wire in position  - Device interrogated and paired by Fwd: [employer] rep. Device with normal function.  - Bedside education provided  - Patient lives 3 hours away and will be following up with his MD at home (patient provided with clinic phone number for any further questions)  - Cleared for discharge from EP perspective    Valerie Kennedy PA-C  #[phone]

## 2021-03-31 NOTE — DISCHARGE NOTE PROVIDER - CARE PROVIDERS DIRECT ADDRESSES
,DirectAddress_Unknown ,DirectAddress_Unknown,[email or web address]

## 2021-03-31 NOTE — DISCHARGE NOTE PROVIDER - CARE PROVIDER_API CALL
Dr. Ever Rivers,   PMD  Phone: (   )    -  Fax: (   )    -  Follow Up Time:    Dr. Ever Rivers,   PMD  Phone: (   )    -  Fax: (   )    -  Follow Up Time:     Sherlyn Concepcion)  Cardiac Electrophysiology; Cardiovascular Disease; Internal Medicine  [address] [place] [address]  Phone: [phone]  Fax: [phone]  Follow Up Time:

## 2021-03-31 NOTE — PROGRESS NOTE ADULT - ASSESSMENT
This si s a 56M with h/o morbid obesity (BMI= 41.9) , HTN ,DM type 2 (3/30 HgbA1C= 6.6), hyperlipidemia and homocystinemia , premature coronary disease,  myocardial infarction at age 28 underwent a PCI ( stents ) on plavix , TIAs and COPD, cardiomyopathy , CHF s/p cardiac pacemaker in 2012 and  extraction with reimplantation of a dual-chamber device in 2017. Admitted for ICD/Lead extraction, Reimplantation of a dual chamber ICD found to have COVID 19 with some cough.

## 2021-03-31 NOTE — DISCHARGE NOTE PROVIDER - NSDCMRMEDTOKEN_GEN_ALL_CORE_FT
Advair Diskus 250 mcg-50 mcg inhalation powder: 1 puff(s) inhaled 2 times a day  Aspir 81 oral delayed release tablet: 1 tab(s) orally once a day (at bedtime)  atorvastatin 80 mg oral tablet: 1 tab(s) orally once a day (at bedtime)  carvedilol 12.5 mg oral tablet: 1 tab(s) orally 2 times a day  clopidogrel 75 mg oral tablet: 1 tab(s) orally once a day  Dexilant 60 mg oral delayed release capsule: 1 cap(s) orally 2 times a day  fenofibrate 160 mg oral tablet: 1 tab(s) orally once a day  Fish Oil 1200 mg oral capsule: 1 cap(s) orally once a day (at bedtime)  folic acid 0.8 mg oral tablet: 1 tab(s) orally 2 times a day  Keppra 750 mg oral tablet: 1 tab(s) orally 2 times a day  Lasix 40 mg oral tablet: 1 tab(s) orally once a day  losartan 25 mg oral tablet: 1 tab(s) orally once a day  nystatin-triamcinolone 100,000 units/g-0.1% topical ointment: Apply topically to affected area 2 times a day  Proventil HFA 90 mcg/inh inhalation aerosol: 2 puff(s) inhaled every 6 hours, As Needed  Spiriva Respimat 10 ACT 2.5 mcg/inh inhalation aerosol: 2 puff(s) inhaled once a day  Toujeo Max SoloStar 300 units/mL subcutaneous solution: 60 unit(s) subcutaneous once a day  Trulicity Pen 1.5 mg/0.5 mL subcutaneous solution: subcutaneous once a week monday  Vitamin B12 1000 mcg oral tablet: 1 tab(s) orally once a day  Vitamin C 1000 mg oral tablet: 1 tab(s) orally once a day (at bedtime)  Vitamin D3 1000 intl units (25 mcg) oral capsule: 1 cap(s) orally once a day (at bedtime)   acetaminophen 325 mg oral tablet: 2 tab(s) orally every 6 hours, As needed, Temp greater or equal to 38.5C (101.3F), Moderate Pain (4 - 6)  Advair Diskus 250 mcg-50 mcg inhalation powder: 1 puff(s) inhaled 2 times a day  Aspir 81 oral delayed release tablet: 1 tab(s) orally once a day (at bedtime)  atorvastatin 80 mg oral tablet: 1 tab(s) orally once a day (at bedtime)  carvedilol 12.5 mg oral tablet: 1 tab(s) orally 2 times a day  clopidogrel 75 mg oral tablet: 1 tab(s) orally once a day  Dexilant 60 mg oral delayed release capsule: 1 cap(s) orally 2 times a day  fenofibrate 160 mg oral tablet: 1 tab(s) orally once a day  Fish Oil 1200 mg oral capsule: 1 cap(s) orally once a day (at bedtime)  folic acid 0.8 mg oral tablet: 1 tab(s) orally 2 times a day  Keppra 750 mg oral tablet: 1 tab(s) orally 2 times a day  Lasix 40 mg oral tablet: 1 tab(s) orally once a day  losartan 25 mg oral tablet: 1 tab(s) orally once a day  Multiple Vitamins oral tablet: 1 tab(s) orally once a day  nystatin-triamcinolone 100,000 units/g-0.1% topical ointment: Apply topically to affected area 2 times a day  Proventil HFA 90 mcg/inh inhalation aerosol: 2 puff(s) inhaled every 6 hours, As Needed  Spiriva Respimat 10 ACT 2.5 mcg/inh inhalation aerosol: 2 puff(s) inhaled once a day  Toujeo Max SoloStar 300 units/mL subcutaneous solution: 60 unit(s) subcutaneous once a day  Trulicity Pen 1.5 mg/0.5 mL subcutaneous solution: subcutaneous once a week monday  Vitamin B12 1000 mcg oral tablet: 1 tab(s) orally once a day  Vitamin C 1000 mg oral tablet: 1 tab(s) orally once a day (at bedtime)  Vitamin D3 1000 intl units (25 mcg) oral capsule: 1 cap(s) orally once a day (at bedtime)

## 2021-03-31 NOTE — DISCHARGE NOTE PROVIDER - NSDCCPCAREPLAN_GEN_ALL_CORE_FT
PRINCIPAL DISCHARGE DIAGNOSIS  Diagnosis: AICD lead malfunction  Assessment and Plan of Treatment: S/p lead extraction and subsequent reimplant ICD for lead fracture  Follow up with your Cardiologist in 1 week      SECONDARY DISCHARGE DIAGNOSES  Diagnosis: COVID-19  Assessment and Plan of Treatment: Follow up with your doctor  You may get your second vaccine on June 28, 2021     PRINCIPAL DISCHARGE DIAGNOSIS  Diagnosis: AICD lead malfunction  Assessment and Plan of Treatment: S/p lead extraction and subsequent reimplant ICD for lead fracture  Follow up with your Cardiologist in 1 week  please follow up with your EP doctor in one week   Please follow up with your primary care physician      SECONDARY DISCHARGE DIAGNOSES  Diagnosis: COVID-19  Assessment and Plan of Treatment: 3/30 s/p monoclonal antibody Regeneron   Follow up with your doctor  **Pfizer COVID vaccination should be delayed 2 months after monoclonal antibody: on or after June 28, 2021      Diagnosis: DM2 (diabetes mellitus, type 2)  Assessment and Plan of Treatment: HgA1C this admission 6.7  Make sure you get your HgA1c checked every three months.  If you take oral diabetes medications, check your blood glucose two times a day.  If you take insulin, check your blood glucose before meals and at bedtime.  It's important not to skip any meals.  Keep a log of your blood glucose results and always take it with you to your doctor appointments.  Keep a list of your current medications including injectables and over the counter medications and bring this medication list with you to all your doctor appointments.  If you have not seen your ophthalmologist this year call for appointment.  Check your feet daily for redness, sores, or openings. Do not self treat. If no improvement in two days call your primary care physician for an appointment.  Low blood sugar (hypoglycemia) is a blood sugar below 70mg/dl. Check your blood sugar if you feel signs/symptoms of hypoglycemia. If your blood sugar is below 70 take 15 grams of carbohydrates (ex 4 oz of apple juice, 3-4 glucose tablets, or 4-6 oz of regular soda) wait 15 minutes and repeat blood sugar to make sure it comes up above 70.  If your blood sugar is above 70 and you are due for a meal, have a meal.  If you are not due for a meal have a snack.  This snack helps keeps your blood sugar at a safe range.    Diagnosis: Coronary artery disease involving native coronary artery of native heart, angina presence unspecified  Assessment and Plan of Treatment: No chest pain, SOB. EKG unchanged from before, no ischemic changes  Please continue with Aspirin, Plavix, Coreg, statin.   Follow up with your cardiologist       Diagnosis: Systolic congestive heart failure, unspecified HF chronicity  Assessment and Plan of Treatment: Weigh yourself daily.  If you gain 3lbs in 3 days, or 5lbs in a week call your Health Care Provider.  Do not eat or drink foods containing more than 2000mg of salt (sodium) in your diet every day.  Call your Health Care Provider if you have any swelling or increased swelling in your feet, ankles, and/or stomach.  The Pt was provided with CHF diet instruction (low sodium diet, daily weights, label reading, Heart Healthy Cooking Tips & Heart Healthy shopping Tips).  Take all of your medication as directed.  If you become dizzy call your Health Care Provider.

## 2021-03-31 NOTE — DISCHARGE NOTE PROVIDER - HOSPITAL COURSE
56M with h/o morbid obesity (BMI= 41.9) , HTN ,DM type 2 (3/30 HgbA1C= 6.6), hyperlipidemia and homocystinemia , premature coronary disease,  myocardial infarction at age 28 underwent a PCI ( stents ) on plavix , TIAs and COPD, cardiomyopathy , CHF s/p cardiac pacemaker in 2012 and  extraction with reimplantation of a dual-chamber device in 2017. Admitted for ICD/Lead extraction, Reimplantation of a dual chamber ICD found to have COVID 19 with some cough.  Patient underwent S/p lead extraction and subsequent reimplant ICD for lead fracture.   Pt c/o mild cough, no SOB, chest pain. Afebrile, not hypoxic, CXR clear, Ferritin 454, CRP 14  Pt had Pfizer Vaccine 10 days ago and had Monoclonal Ab yesterday. So 60 days later he will get his 2nd dose of Pfizer vaccine per ID. No need of Remdesivir/Dexamethasone   This is s a 56M with h/o morbid obesity (BMI= 41.9) , HTN ,DM type 2 (3/30 HgbA1C= 6.6), hyperlipidemia and homocystinemia , premature coronary disease,  myocardial infarction at age 28 underwent a PCI ( stents ) on plavix , TIAs and COPD, cardiomyopathy , CHF s/p cardiac pacemaker in 2012 and extraction with reimplantation of a dual-chamber device in 2017 admitted for ICD/Lead extraction, reimplantation of a dual chamber ICD found to have COVID 19 with some cough. He was evaluated by ID and EP cardiology, plan od care is outlined as below-    1. Failure of implantable cardioverter-defibrillator (ICD) lead, initial encounter-  Patient underwent s/p lead extraction and subsequent reimplant ICD for lead fracture  - was continued on DAPT, Coreg, statin  - he will f/u with his Cardiologist in 2 weeks    2. COVID-19 infection-  Afebrile, no SOB, chest pain and not hypoxic, CXR clear. O2 sat 96%  - ID cleared for discharge. He had Pfizer Vaccine 12 days ago and had Monoclonal Ab 2 days ago priot to admission. So 60 days later he will get his 2nd dose of Pfizer vaccine per ID  - No need of Remdesivir/Dexamethasone as he was not hypoxic  - c/w home bronchodilators as needed     3. Coronary artery disease involving native coronary artery of native hear  No chest pain, SOB. EKG unchanged from before, no ischemic changes  - c/w Plavix, Coreg, statin.     4. History of seizure-  c/w Keppra at home doses.     5. Morbid obesity-  Advised to reduce weight with some exercise and diet control.    The patient remained hemodynamically stable and was discharged home.

## 2021-03-31 NOTE — DISCHARGE NOTE PROVIDER - PROVIDER TOKENS
FREE:[LAST:[Dr. Ever Rivers],PHONE:[(   )    -],FAX:[(   )    -],ADDRESS:[PMD]] FREE:[LAST:[Dr. Ever Rivers],PHONE:[(   )    -],FAX:[(   )    -],ADDRESS:[PMD]],PROVIDER:[TOKEN:[[id number]]]

## 2021-03-31 NOTE — PROGRESS NOTE ADULT - ASSESSMENT
56M with malfunction of PPM  Medical history includes: Morbid obesity (BMI= 41.9) , HTN ,DM type 2 (3/30 HgbA1C= 6.6), hyperlipidemia and homocystinemia , premature coronary disease,  myocardial infarction at age 28 underwent a PCI ( stents ) on plavix , TIAs and COPD, cardiomyopathy , CHF s/p cardiac pacemaker in 2012 and  extraction with reimplantation of a dual-chamber device in 2017.   3/29 Tested +COVID  3/30:  ICD/Lead extraction, Reimplantation of a dual chamber ICD    He is asymptomatic, comfortable and oxygenating well on room air.      He had COVID Vaccination #1 recently. He is disabled and lives with mother and sister - he states that they both had covid and were careless and infected him.   3/31/21 COVID nucleocapsid antibody  is negative confirming early infection  3/30 s/p monoclonal antibody Regeneron cocktail casirivimab/imdevimab infusion    His comorbidities of cardiovascular disease, DM, HTN, COPD and obesity pose increase risk of complications, but the recent vaccination and monoclonal antibody infusion hopefully will provide protection    Plan  monitor oxygenation  Pfizer COVID vaccination should be delayed 2 months after monoclonal antibody: on or after June 28, 2021    Patient has pulse ox device at home.

## 2021-04-01 ENCOUNTER — TRANSCRIPTION ENCOUNTER (OUTPATIENT)
Age: 56
End: 2021-04-01

## 2021-04-01 LAB — GLUCOSE BLDC GLUCOMTR-MCNC: 206 MG/DL — HIGH (ref 70–99)

## 2021-04-01 PROCEDURE — 86769 SARS-COV-2 COVID-19 ANTIBODY: CPT

## 2021-04-01 PROCEDURE — U0003: CPT

## 2021-04-01 PROCEDURE — 85027 COMPLETE CBC AUTOMATED: CPT

## 2021-04-01 PROCEDURE — 71045 X-RAY EXAM CHEST 1 VIEW: CPT

## 2021-04-01 PROCEDURE — 99233 SBSQ HOSP IP/OBS HIGH 50: CPT | Mod: 25

## 2021-04-01 PROCEDURE — 83036 HEMOGLOBIN GLYCOSYLATED A1C: CPT

## 2021-04-01 PROCEDURE — G0463: CPT

## 2021-04-01 PROCEDURE — 86850 RBC ANTIBODY SCREEN: CPT

## 2021-04-01 PROCEDURE — 85025 COMPLETE CBC W/AUTO DIFF WBC: CPT

## 2021-04-01 PROCEDURE — C1777: CPT

## 2021-04-01 PROCEDURE — 86901 BLOOD TYPING SEROLOGIC RH(D): CPT

## 2021-04-01 PROCEDURE — 86803 HEPATITIS C AB TEST: CPT

## 2021-04-01 PROCEDURE — C1889: CPT

## 2021-04-01 PROCEDURE — C9803: CPT

## 2021-04-01 PROCEDURE — 86140 C-REACTIVE PROTEIN: CPT

## 2021-04-01 PROCEDURE — 93005 ELECTROCARDIOGRAM TRACING: CPT

## 2021-04-01 PROCEDURE — C9399: CPT

## 2021-04-01 PROCEDURE — 82728 ASSAY OF FERRITIN: CPT

## 2021-04-01 PROCEDURE — C1721: CPT

## 2021-04-01 PROCEDURE — 94640 AIRWAY INHALATION TREATMENT: CPT

## 2021-04-01 PROCEDURE — 80048 BASIC METABOLIC PNL TOTAL CA: CPT

## 2021-04-01 PROCEDURE — C1894: CPT

## 2021-04-01 PROCEDURE — C1898: CPT

## 2021-04-01 PROCEDURE — 85379 FIBRIN DEGRADATION QUANT: CPT

## 2021-04-01 PROCEDURE — 85652 RBC SED RATE AUTOMATED: CPT

## 2021-04-01 PROCEDURE — 71046 X-RAY EXAM CHEST 2 VIEWS: CPT

## 2021-04-01 PROCEDURE — U0005: CPT

## 2021-04-01 PROCEDURE — C1773: CPT

## 2021-04-01 PROCEDURE — 99239 HOSP IP/OBS DSCHRG MGMT >30: CPT | Mod: CS

## 2021-04-01 PROCEDURE — 86900 BLOOD TYPING SEROLOGIC ABO: CPT

## 2021-04-01 PROCEDURE — 86923 COMPATIBILITY TEST ELECTRIC: CPT

## 2021-04-01 PROCEDURE — 99232 SBSQ HOSP IP/OBS MODERATE 35: CPT | Mod: CS

## 2021-04-01 PROCEDURE — C1892: CPT

## 2021-04-01 PROCEDURE — C1769: CPT

## 2021-04-01 PROCEDURE — 80053 COMPREHEN METABOLIC PANEL: CPT

## 2021-04-01 PROCEDURE — 82962 GLUCOSE BLOOD TEST: CPT

## 2021-04-01 PROCEDURE — 76000 FLUOROSCOPY <1 HR PHYS/QHP: CPT

## 2021-04-01 RX ORDER — ACETAMINOPHEN 500 MG
2 TABLET ORAL
Qty: 0 | Refills: 0 | DISCHARGE
Start: 2021-04-01

## 2021-04-01 RX ADMIN — LOSARTAN POTASSIUM 25 MILLIGRAM(S): 100 TABLET, FILM COATED ORAL at 05:01

## 2021-04-01 RX ADMIN — CLOPIDOGREL BISULFATE 75 MILLIGRAM(S): 75 TABLET, FILM COATED ORAL at 11:39

## 2021-04-01 RX ADMIN — Medication 1000 MILLIGRAM(S): at 11:39

## 2021-04-01 RX ADMIN — Medication 4: at 12:17

## 2021-04-01 RX ADMIN — Medication 1 TABLET(S): at 11:39

## 2021-04-01 RX ADMIN — Medication 40 MILLIGRAM(S): at 05:01

## 2021-04-01 RX ADMIN — PANTOPRAZOLE SODIUM 40 MILLIGRAM(S): 20 TABLET, DELAYED RELEASE ORAL at 05:01

## 2021-04-01 RX ADMIN — PREGABALIN 1000 MICROGRAM(S): 225 CAPSULE ORAL at 11:39

## 2021-04-01 RX ADMIN — LEVETIRACETAM 750 MILLIGRAM(S): 250 TABLET, FILM COATED ORAL at 05:01

## 2021-04-01 NOTE — PROGRESS NOTE ADULT - ATTENDING COMMENTS
seen and agree with plan  follow up with Dr. Villatoro in [place]
seen and agree with assessment and plan

## 2021-04-01 NOTE — PROGRESS NOTE ADULT - PROBLEM SELECTOR PLAN 6
Advised to reduce weight with some exercise and diet control
Advised to reduce weight with some exercise and diet control

## 2021-04-01 NOTE — PROGRESS NOTE ADULT - PROVIDER SPECIALTY LIST ADULT
Infectious Disease
Electrophysiology
Electrophysiology
Infectious Disease
Internal Medicine
Internal Medicine

## 2021-04-01 NOTE — PROGRESS NOTE ADULT - PROBLEM SELECTOR PLAN 2
c/o mild cough, no SOB, chest pain. Afebrile, not hypoxic, CXR clear, Ferritin 454, CRP 14  - appreciated ID rec. He had Pfizer Vaccine 10 days ago and had Monoclonal Ab yesterday. So 60 days later he will get his 2nd dose of Pfizer vaccine per ID  - No need of Remdesivir/Dexamethasone  - c/w home bronchodilators  - Observe overnight, if stable d/c tomorrow
Afebrile, no SOB, chest pain and not hypoxic, CXR clear. O2 sat 96%  - ID cleared for d/c today. He had Pfizer Vaccine 12 days ago and had Monoclonal Ab 2 days ago. So 60 days later he will get his 2nd dose of Pfizer vaccine per ID  - No need of Remdesivir/Dexamethasone  - c/w home bronchodilators. Check O2 in Pulse Oximeter (given from hospital). If <94% persistently, he will seek ER visit  - d/c home today, CM spoke to him

## 2021-04-01 NOTE — PROGRESS NOTE ADULT - PROBLEM SELECTOR PLAN 4
HbA1C 6.7%,   - c/w Lantus 48units qhs and SS for now
HbA1C 6.7%,   - c/w home dose of insulin regimens

## 2021-04-01 NOTE — DISCHARGE NOTE NURSING/CASE MANAGEMENT/SOCIAL WORK - PATIENT PORTAL LINK FT
You can access the FollowMyHealth Patient Portal offered by [facility] by registering at the following website: [email or web address]. By joining [facility]’s FollowMyHealth portal, you will also be able to view your health information using other applications (apps) compatible with our system.

## 2021-04-01 NOTE — PROGRESS NOTE ADULT - PROBLEM SELECTOR PLAN 3
no chest pain, SOB. EKG unchanged from before, no ischemic changes  - c/w Plavix, Coreg, statin
no chest pain, SOB. EKG unchanged from before, no ischemic changes  - c/w Plavix, Coreg, statin

## 2021-04-01 NOTE — PROGRESS NOTE ADULT - REASON FOR ADMISSION
Device replacement

## 2021-04-01 NOTE — PROGRESS NOTE ADULT - ASSESSMENT
56M with malfunction of PPM  Medical history includes: Morbid obesity (BMI= 41.9) , HTN ,DM type 2 (3/30 HgbA1C= 6.6), hyperlipidemia and homocystinemia , premature coronary disease,  myocardial infarction at age 28 underwent a PCI ( stents ) on plavix , TIAs and COPD, cardiomyopathy , CHF s/p cardiac pacemaker in 2012 and  extraction with reimplantation of a dual-chamber device in 2017.   3/29 Tested +COVID  3/30:  ICD/Lead extraction, Reimplantation of a dual chamber ICD    He is asymptomatic, comfortable and oxygenating well on room air.      He had COVID Vaccination #1 3/21/21. 1-2 days after vaccination he noted malaise and felt feverish which he attributed to the vaccine. He states he had dry cough, stomach upset, loose stools and emesis. He was unable to eat and lost weight. He is disabled and lives with mother and sister - he states that they both had covid and were careless and infected him.   3/31/21 COVID nucleocapsid antibody  is negative confirming early infection  3/30 s/p monoclonal antibody Regeneron cocktail casirivimab/imdevimab infusion    His comorbidities of cardiovascular disease, DM, HTN, COPD and obesity pose increase risk of complications, but the recent vaccination and monoclonal antibody infusion hopefully will provide protection    Plan  no ID objection to discharge  Pfizer COVID vaccination should be delayed 2 months after monoclonal antibody: on or after June 28, 2021    Patient has pulse ox device at home.    discussed with primary MD

## 2021-04-01 NOTE — PROGRESS NOTE ADULT - SUBJECTIVE AND OBJECTIVE BOX
24H hour events: No acute overnight events    MEDICATIONS:  carvedilol 12.5 milliGRAM(s) Oral every 12 hours  clopidogrel Tablet 75 milliGRAM(s) Oral daily  furosemide    Tablet 40 milliGRAM(s) Oral daily  losartan 25 milliGRAM(s) Oral daily  cefuroxime  IVPB 1500 milliGRAM(s) IV Intermittent once  tiotropium 18 MICROgram(s) Capsule 1 Capsule(s) Inhalation daily  acetaminophen   Tablet .. 650 milliGRAM(s) Oral every 6 hours PRN  HYDROmorphone  Injectable 0.5 milliGRAM(s) IV Push every 10 minutes PRN  HYDROmorphone  Injectable 1 milliGRAM(s) IV Push every 10 minutes PRN  levETIRAcetam 750 milliGRAM(s) Oral two times a day  pantoprazole    Tablet 40 milliGRAM(s) Oral before breakfast  atorvastatin 80 milliGRAM(s) Oral at bedtime  dextrose 40% Gel 15 Gram(s) Oral once  dextrose 50% Injectable 25 Gram(s) IV Push once  dextrose 50% Injectable 12.5 Gram(s) IV Push once  dextrose 50% Injectable 25 Gram(s) IV Push once  glucagon  Injectable 1 milliGRAM(s) IntraMuscular once  insulin glargine Injectable (LANTUS) 48 Unit(s) SubCutaneous at bedtime  insulin lispro (ADMELOG) corrective regimen sliding scale   SubCutaneous three times a day before meals  insulin lispro (ADMELOG) corrective regimen sliding scale   SubCutaneous at bedtime  ascorbic acid 1000 milliGRAM(s) Oral daily  cholecalciferol 1000 Unit(s) Oral at bedtime  cyanocobalamin 1000 MICROGram(s) Oral daily  dextrose 5%. 1000 milliLiter(s) IV Continuous <Continuous>  dextrose 5%. 1000 milliLiter(s) IV Continuous <Continuous>  multivitamin 1 Tablet(s) Oral daily      REVIEW OF SYSTEMS:  See HPI, otherwise ROS negative.    PHYSICAL EXAM:  T(C): 36.4 (04-01-21 @ 04:48), Max: 36.8 (03-31-21 @ 19:53)  HR: 56 (04-01-21 @ 04:48) (53 - 67)  BP: 114/67 (04-01-21 @ 04:48) (102/58 - 114/67)  RR: 18 (04-01-21 @ 04:48) (18 - 18)  SpO2: 96% (04-01-21 @ 04:48) (95% - 96%)  Wt(kg): --  I&O's Summary      Physical Exam as per primary team      LABS:	 	    CBC Full  -  ( 01 Apr 2021 06:49 )  WBC Count : 5.85 K/uL  Hemoglobin : 11.9 g/dL  Hematocrit : 37.1 %  Platelet Count - Automated : 195 K/uL  Mean Cell Volume : 85.5 fl  Mean Cell Hemoglobin : 27.4 pg  Mean Cell Hemoglobin Concentration : 32.1 gm/dL  Auto Neutrophil # : 3.63 K/uL  Auto Lymphocyte # : 1.67 K/uL  Auto Monocyte # : 0.47 K/uL  Auto Eosinophil # : 0.04 K/uL  Auto Basophil # : 0.00 K/uL  Auto Neutrophil % : 62.1 %  Auto Lymphocyte % : 28.5 %  Auto Monocyte % : 8.0 %  Auto Eosinophil % : 0.7 %  Auto Basophil % : 0.0 %    04-01    137  |  103  |  19  ----------------------------<  115<H>  3.5   |  24  |  0.93    Ca    8.8      01 Apr 2021 06:49    TPro  6.3  /  Alb  3.8  /  TBili  0.3  /  DBili  x   /  AST  31  /  ALT  30  /  AlkPhos  47  04-01    TELEMETRY: Sinus rhythm @ 50-60 BPM    
  Follow Up:  covid    Interval History/ROS:  some loose stool, no sob, occasional cough    Allergies  No Known Allergies      ANTIMICROBIALS:  cefuroxime  IVPB 1500 once      OTHER MEDS:  MEDICATIONS  (STANDING):  acetaminophen   Tablet .. 650 every 6 hours PRN  atorvastatin 80 at bedtime  carvedilol 12.5 every 12 hours  clopidogrel Tablet 75 daily  dextrose 40% Gel 15 once  dextrose 50% Injectable 25 once  dextrose 50% Injectable 12.5 once  dextrose 50% Injectable 25 once  furosemide    Tablet 40 daily  glucagon  Injectable 1 once  HYDROmorphone  Injectable 0.5 every 10 minutes PRN  HYDROmorphone  Injectable 1 every 10 minutes PRN  insulin glargine Injectable (LANTUS) 48 at bedtime  insulin lispro (ADMELOG) corrective regimen sliding scale  three times a day before meals  insulin lispro (ADMELOG) corrective regimen sliding scale  at bedtime  levETIRAcetam 750 two times a day  losartan 25 daily  pantoprazole    Tablet 40 before breakfast  tiotropium 18 MICROgram(s) Capsule 1 daily      Vital Signs Last 24 Hrs  T(C): 36.4 (01 Apr 2021 04:48), Max: 36.8 (31 Mar 2021 19:53)  T(F): 97.5 (01 Apr 2021 04:48), Max: 98.3 (31 Mar 2021 19:53)  HR: 56 (01 Apr 2021 04:48) (53 - 67)  BP: 114/67 (01 Apr 2021 04:48) (102/58 - 114/67)  BP(mean): --  RR: 18 (01 Apr 2021 04:48) (18 - 18)  SpO2: 96% (01 Apr 2021 04:48) (95% - 96%)  ROOM AIR    PHYSICAL EXAM:  General:  NAD, Non-toxic  Neurology: A&Ox3, nonfocal  Respiratory: Clear to auscultation bilaterally  CV: RRR, S1S2, no murmurs, rubs or gallops  Abdominal: Soft, Non-tender, non-distended  Extremities: No edema,  Line Sites: Clear  Skin: No rash                          11.9   5.85  )-----------( 195      ( 01 Apr 2021 06:49 )             37.1       04-01    137  |  103  |  19  ----------------------------<  115<H>  3.5   |  24  |  0.93    Ca    8.8      01 Apr 2021 06:49    TPro  6.3  /  Alb  3.8  /  TBili  0.3  /  DBili  x   /  AST  31  /  ALT  30  /  AlkPhos  47  04-01 03.31.21 @ 06:28) C-Reactive Protein, Serum: 14  :03.31.21 @ 03:29) Ferritin, Serum: 454 ng/mL   03.30.21 @ 19:58) D-Dimer Assay, Quantitative: 804 ng/mL DDU     03.31.21 @ 01:58) COVID-19 Nucleocapsid Total KAM Antibody Result: 0.37 NEG  03.29.21 @ 12:40)  COVID-19 PCR: Detected:  RADIOLOGY:  < from: Xray Chest 2 Views PA/Lat (03.31.21 @ 12:31) >  The heart is normal in size. Thelungs are clear. No pleural effusion. No pneumothorax. A pacer is seen on the left and the tip of the electrodes are in the right atrium and right ventricle. No pleural effusion. No pneumothorax.    < end of copied text >      Alfredo Boyle MD; Division of Infectious Disease; Pager: [phone]; nights and weekends: [phone]
24H hour events: No acute overnight events.     MEDICATIONS:  carvedilol 12.5 milliGRAM(s) Oral every 12 hours  clopidogrel Tablet 75 milliGRAM(s) Oral daily  furosemide    Tablet 40 milliGRAM(s) Oral daily  losartan 25 milliGRAM(s) Oral daily  cefuroxime  IVPB 1500 milliGRAM(s) IV Intermittent once  tiotropium 18 MICROgram(s) Capsule 1 Capsule(s) Inhalation daily  acetaminophen   Tablet .. 650 milliGRAM(s) Oral every 6 hours PRN  HYDROmorphone  Injectable 0.5 milliGRAM(s) IV Push every 10 minutes PRN  HYDROmorphone  Injectable 1 milliGRAM(s) IV Push every 10 minutes PRN  levETIRAcetam 750 milliGRAM(s) Oral two times a day  pantoprazole    Tablet 40 milliGRAM(s) Oral before breakfast  atorvastatin 80 milliGRAM(s) Oral at bedtime  dextrose 40% Gel 15 Gram(s) Oral once  dextrose 50% Injectable 25 Gram(s) IV Push once  dextrose 50% Injectable 12.5 Gram(s) IV Push once  dextrose 50% Injectable 25 Gram(s) IV Push once  glucagon  Injectable 1 milliGRAM(s) IntraMuscular once  insulin glargine Injectable (LANTUS) 48 Unit(s) SubCutaneous at bedtime  insulin lispro (ADMELOG) corrective regimen sliding scale   SubCutaneous three times a day before meals  insulin lispro (ADMELOG) corrective regimen sliding scale   SubCutaneous at bedtime  ascorbic acid 1000 milliGRAM(s) Oral daily  cholecalciferol 1000 Unit(s) Oral at bedtime  cyanocobalamin 1000 MICROGram(s) Oral daily  dextrose 5%. 1000 milliLiter(s) IV Continuous <Continuous>  dextrose 5%. 1000 milliLiter(s) IV Continuous <Continuous>  multivitamin 1 Tablet(s) Oral daily      REVIEW OF SYSTEMS:  See HPI, otherwise ROS negative.    PHYSICAL EXAM:  T(C): 36.7 (03-31-21 @ 12:09), Max: 36.7 (03-31-21 @ 12:09)  HR: 54 (03-31-21 @ 12:09) (51 - 65)  BP: 108/60 (03-31-21 @ 12:09) (101/59 - 135/81)  RR: 78 (03-31-21 @ 12:09) (18 - 78)  SpO2: 96% (03-31-21 @ 12:09) (93% - 97%)  Wt(kg): --  I&O's Summary    30 Mar 2021 07:01  -  31 Mar 2021 07:00  --------------------------------------------------------  IN: 480 mL / OUT: 1050 mL / NET: -570 mL        Appearance: Alert. NAD	  Cardiovascular: +S1S2 RRR no m/g/r  Respiratory: CTA B/L	  Skin: Left chest wall site with stero-strips in place, no edema, erythema or ecchymosis.  Extremities: No edema BLE  Vascular: Peripheral pulses palpable 2+ bilaterally      LABS:	 	    CBC Full  -  ( 29 Mar 2021 19:55 )  WBC Count : 3.45 K/uL  Hemoglobin : 13.4 g/dL  Hematocrit : 41.5 %  Platelet Count - Automated : 173 K/uL  Mean Cell Volume : 85.6 fl  Mean Cell Hemoglobin : 27.6 pg  Mean Cell Hemoglobin Concentration : 32.3 gm/dL  Auto Neutrophil # : x  Auto Lymphocyte # : x  Auto Monocyte # : x  Auto Eosinophil # : x  Auto Basophil # : x  Auto Neutrophil % : x  Auto Lymphocyte % : x  Auto Monocyte % : x  Auto Eosinophil % : x  Auto Basophil % : x    03-29    138  |  101  |  24<H>  ----------------------------<  120<H>  3.7   |  23  |  1.31<H>    Ca    9.4      29 Mar 2021 19:55    TELEMETRY: Sinus 50-60 BPM, A pacing  	    ECG:  Sinus rhythm, atrial pacing @51 BPM	    
  Follow Up:  covid    Interval History/ROS: expresses anger at family members and frustration getting covid, No resp distress, Feels sweaty at times.    Allergies  No Known Allergies    ANTIMICROBIALS:  cefuroxime  IVPB 1500 once      OTHER MEDS:  MEDICATIONS  (STANDING):  acetaminophen   Tablet .. 650 every 6 hours PRN  atorvastatin 80 at bedtime  carvedilol 12.5 every 12 hours  clopidogrel Tablet 75 daily  dextrose 40% Gel 15 once  dextrose 50% Injectable 25 once  dextrose 50% Injectable 12.5 once  dextrose 50% Injectable 25 once  furosemide    Tablet 40 daily  glucagon  Injectable 1 once  HYDROmorphone  Injectable 0.5 every 10 minutes PRN  HYDROmorphone  Injectable 1 every 10 minutes PRN  insulin glargine Injectable (LANTUS) 48 at bedtime  insulin lispro (ADMELOG) corrective regimen sliding scale  three times a day before meals  insulin lispro (ADMELOG) corrective regimen sliding scale  at bedtime  levETIRAcetam 750 two times a day  losartan 25 daily  pantoprazole    Tablet 40 before breakfast  tiotropium 18 MICROgram(s) Capsule 1 daily      Vital Signs Last 24 Hrs  T(C): 36.7 (31 Mar 2021 12:09), Max: 36.7 (31 Mar 2021 12:09)  T(F): 98 (31 Mar 2021 12:09), Max: 98 (31 Mar 2021 12:09)  HR: 54 (31 Mar 2021 12:09) (51 - 65)  BP: 108/60 (31 Mar 2021 12:09) (101/59 - 135/81)  BP(mean): --  RR: 78 (31 Mar 2021 12:09) (18 - 78)  SpO2: 96% (31 Mar 2021 12:09) (93% - 97%)  ROOM AIR    PHYSICAL EXAM:  General: WN/WD NAD, Non-toxic  Neurology: A&Ox3, nonfocal  Respiratory: Clear to auscultation bilaterally  Abdominal: Soft, Non-tender, non-distended  Extremities: No edema,  Line Sites: Clear  Skin: No rash                        13.4   3.45  )-----------( 173      ( 29 Mar 2021 19:55 )             41.5       03-29    138  |  101  |  24<H>  ----------------------------<  120<H>  3.7   |  23  |  1.31<H>    Ca    9.4      29 Mar 2021 19:55    03.31.21 @ 06:28) C-Reactive Protein, Serum: 14:  03.31.21 @ 03:29) Ferritin, Serum: 454 ng/mL   03.30.21 @ 19:58) D-Dimer Assay, Quantitative: 804 ng/mL DDU   03.31.21 @ 01:58) COVID-19 Nucleocapsid Total KAM Antibody Result: 0.37:   03.29.21 @ 12:40) COVID-19 PCR: Detected:     RADIOLOGY:  < from: Xray Chest 2 Views PA/Lat (03.31.21 @ 12:31) >  The heart is normal in size. Thelungs are clear. No pleural effusion. No pneumothorax. A pacer is seen on the left and the tip of the electrodes are in the right atrium and right ventricle. No pleural effusion. No pneumothorax.      < end of copied text >      Alfredo Boyle MD; Division of Infectious Disease; Pager: [phone]; nights and weekends: [phone]
Mohsin Khan, MD  Attending Physician, Division Of Hospital Medicine  Pager: [phone], Office: [phone]  Off hour pager: [phone]    Patient is a 56y old  Male who presents with a chief complaint of Device replacement     SUBJECTIVE / OVERNIGHT EVENTS:  Seen, examined the patient this am  Afebrile, no chest pain, SOB, feels ok, Had ICD yesterday, hemodynamically stable    MEDICATIONS  (STANDING):  ascorbic acid 1000 milliGRAM(s) Oral daily  atorvastatin 80 milliGRAM(s) Oral at bedtime  carvedilol 12.5 milliGRAM(s) Oral every 12 hours  cefuroxime  IVPB 1500 milliGRAM(s) IV Intermittent once  cholecalciferol 1000 Unit(s) Oral at bedtime  clopidogrel Tablet 75 milliGRAM(s) Oral daily  cyanocobalamin 1000 MICROGram(s) Oral daily  dextrose 40% Gel 15 Gram(s) Oral once  dextrose 5%. 1000 milliLiter(s) (50 mL/Hr) IV Continuous <Continuous>  dextrose 5%. 1000 milliLiter(s) (100 mL/Hr) IV Continuous <Continuous>  dextrose 50% Injectable 25 Gram(s) IV Push once  dextrose 50% Injectable 12.5 Gram(s) IV Push once  dextrose 50% Injectable 25 Gram(s) IV Push once  furosemide    Tablet 40 milliGRAM(s) Oral daily  glucagon  Injectable 1 milliGRAM(s) IntraMuscular once  insulin glargine Injectable (LANTUS) 48 Unit(s) SubCutaneous at bedtime  insulin lispro (ADMELOG) corrective regimen sliding scale   SubCutaneous three times a day before meals  insulin lispro (ADMELOG) corrective regimen sliding scale   SubCutaneous at bedtime  levETIRAcetam 750 milliGRAM(s) Oral two times a day  losartan 25 milliGRAM(s) Oral daily  multivitamin 1 Tablet(s) Oral daily  pantoprazole    Tablet 40 milliGRAM(s) Oral before breakfast  tiotropium 18 MICROgram(s) Capsule 1 Capsule(s) Inhalation daily    MEDICATIONS  (PRN):  acetaminophen   Tablet .. 650 milliGRAM(s) Oral every 6 hours PRN Temp greater or equal to 38.5C (101.3F), Moderate Pain (4 - 6)  HYDROmorphone  Injectable 0.5 milliGRAM(s) IV Push every 10 minutes PRN Moderate Pain (4 - 6)  HYDROmorphone  Injectable 1 milliGRAM(s) IV Push every 10 minutes PRN Severe Pain (7 - 10)      Vital Signs Last 24 Hrs  T(C): 36.7 (31 Mar 2021 12:09), Max: 36.7 (31 Mar 2021 12:09)  T(F): 98 (31 Mar 2021 12:09), Max: 98 (31 Mar 2021 12:09)  HR: 54 (31 Mar 2021 12:09) (51 - 65)  BP: 108/60 (31 Mar 2021 12:09) (101/59 - 135/81)  BP(mean): --  RR: 78 (31 Mar 2021 12:09) (18 - 78)  SpO2: 96% (31 Mar 2021 12:09) (93% - 97%)  CAPILLARY BLOOD GLUCOSE      POCT Blood Glucose.: 218 mg/dL (31 Mar 2021 13:02)  POCT Blood Glucose.: 161 mg/dL (31 Mar 2021 08:26)  POCT Blood Glucose.: 266 mg/dL (31 Mar 2021 00:08)    I&O's Summary    30 Mar 2021 07:01  -  31 Mar 2021 07:00  --------------------------------------------------------  IN: 480 mL / OUT: 1050 mL / NET: -570 mL        PHYSICAL EXAM:-  GENERAL: NAD, well-developed  EYES: EOMI, PERRLA, conjunctiva and sclera clear  NECK: Supple, No JVD, no thyromegaly  CHEST/LUNG: Clear to auscultation bilaterally; No wheeze  HEART: Regular rate and rhythm; S1, S2 audible, No murmurs, rubs, or gallops  ABDOMEN: Soft, Nontender, Nondistended; Bowel sounds present  EXTREMITIES:  2+ Peripheral Pulses, No clubbing, cyanosis, or edema  NEURO: AAOx3, no focal deficit      LABS:                        13.4   3.45  )-----------( 173      ( 29 Mar 2021 19:55 )             41.5     03-29    138  |  101  |  24<H>  ----------------------------<  120<H>  3.7   |  23  |  1.31<H>    Ca    9.4      29 Mar 2021 19:55      RADIOLOGY & ADDITIONAL TESTS:    Imaging Personally Reviewed: CXR  Consultant(s) Notes Reviewed: EP, ID   Care Discussed with Consultants/Other Providers: ID, MAURICE  
Mohsin Khan, MD  Attending Physician, Division Of Hospital Medicine  Pager: [phone], Office: [phone]  Off hour pager: [phone]    Patient is a 56y old  Male who presents with a chief complaint of Device replacement    SUBJECTIVE / OVERNIGHT EVENTS:  Seen, examined the patient this am  Sitting in bed, feels ok, no c/o chest pain, SOB, less cough. In good spirit, Tele- no event  Hemodynamically stable    MEDICATIONS  (STANDING):  ascorbic acid 1000 milliGRAM(s) Oral daily  atorvastatin 80 milliGRAM(s) Oral at bedtime  carvedilol 12.5 milliGRAM(s) Oral every 12 hours  cefuroxime  IVPB 1500 milliGRAM(s) IV Intermittent once  cholecalciferol 1000 Unit(s) Oral at bedtime  clopidogrel Tablet 75 milliGRAM(s) Oral daily  cyanocobalamin 1000 MICROGram(s) Oral daily  dextrose 40% Gel 15 Gram(s) Oral once  dextrose 5%. 1000 milliLiter(s) (50 mL/Hr) IV Continuous <Continuous>  dextrose 5%. 1000 milliLiter(s) (100 mL/Hr) IV Continuous <Continuous>  dextrose 50% Injectable 25 Gram(s) IV Push once  dextrose 50% Injectable 12.5 Gram(s) IV Push once  dextrose 50% Injectable 25 Gram(s) IV Push once  furosemide    Tablet 40 milliGRAM(s) Oral daily  glucagon  Injectable 1 milliGRAM(s) IntraMuscular once  insulin glargine Injectable (LANTUS) 48 Unit(s) SubCutaneous at bedtime  insulin lispro (ADMELOG) corrective regimen sliding scale   SubCutaneous three times a day before meals  insulin lispro (ADMELOG) corrective regimen sliding scale   SubCutaneous at bedtime  levETIRAcetam 750 milliGRAM(s) Oral two times a day  losartan 25 milliGRAM(s) Oral daily  multivitamin 1 Tablet(s) Oral daily  pantoprazole    Tablet 40 milliGRAM(s) Oral before breakfast  tiotropium 18 MICROgram(s) Capsule 1 Capsule(s) Inhalation daily    MEDICATIONS  (PRN):  acetaminophen   Tablet .. 650 milliGRAM(s) Oral every 6 hours PRN Temp greater or equal to 38.5C (101.3F), Moderate Pain (4 - 6)  HYDROmorphone  Injectable 0.5 milliGRAM(s) IV Push every 10 minutes PRN Moderate Pain (4 - 6)  HYDROmorphone  Injectable 1 milliGRAM(s) IV Push every 10 minutes PRN Severe Pain (7 - 10)      Vital Signs Last 24 Hrs  T(C): 36.4 (01 Apr 2021 04:48), Max: 36.8 (31 Mar 2021 19:53)  T(F): 97.5 (01 Apr 2021 04:48), Max: 98.3 (31 Mar 2021 19:53)  HR: 56 (01 Apr 2021 04:48) (53 - 67)  BP: 114/67 (01 Apr 2021 04:48) (102/58 - 114/67)  BP(mean): --  RR: 18 (01 Apr 2021 04:48) (18 - 18)  SpO2: 96% (01 Apr 2021 04:48) (95% - 96%)  CAPILLARY BLOOD GLUCOSE      POCT Blood Glucose.: 206 mg/dL (01 Apr 2021 11:53)  POCT Blood Glucose.: 112 mg/dL (01 Apr 2021 08:09)  POCT Blood Glucose.: 203 mg/dL (31 Mar 2021 21:15)  POCT Blood Glucose.: 172 mg/dL (31 Mar 2021 17:43)  POCT Blood Glucose.: 218 mg/dL (31 Mar 2021 13:02)    I&O's Summary      PHYSICAL EXAM:-  GENERAL: NAD, well-developed  EYES: EOMI, PERRLA, conjunctiva and sclera clear  NECK: Supple, No JVD, no thyromegaly  CHEST/LUNG: Clear to auscultation bilaterally; No wheeze  HEART: Regular rate and rhythm; S1, S2 audible, No murmurs, rubs, or gallops  ABDOMEN: Soft, Nontender, Nondistended; Bowel sounds present  EXTREMITIES:  2+ Peripheral Pulses, No clubbing, cyanosis, or edema  NEURO: AAOx3, no focal deficit      LABS:                        11.9   5.85  )-----------( 195      ( 01 Apr 2021 06:49 )             37.1     04-01    137  |  103  |  19  ----------------------------<  115<H>  3.5   |  24  |  0.93    Ca    8.8      01 Apr 2021 06:49    TPro  6.3  /  Alb  3.8  /  TBili  0.3  /  DBili  x   /  AST  31  /  ALT  30  /  AlkPhos  47  04-01        RADIOLOGY & ADDITIONAL TESTS:    Imaging Personally Reviewed: CXR  Consultant(s) Notes Reviewed: EP   Care Discussed with Consultants/Other Providers: EP

## 2021-04-01 NOTE — PROGRESS NOTE ADULT - PROBLEM SELECTOR PROBLEM 1
Failure of implantable cardioverter-defibrillator (ICD) lead, initial encounter
Failure of implantable cardioverter-defibrillator (ICD) lead, initial encounter

## 2021-04-01 NOTE — PROGRESS NOTE ADULT - ASSESSMENT
56 year old male with PMHx of morbid obesity, CHF s/p PPM, HTN, T2DM, HLD, CAD s/p PCI, MI, COPD admitted with lead fracture requiring extraction and reimplantation.    1. Lead fracture    - S/p lead extraction and subsequent reimplant on 3/30.  - Patient lives 3 hours away and will be following up with his MD at home (patient provided with clinic phone number for any further questions).  - Cleared for discharge from EP perspective.    Valerie Kennedy PA-C  #[phone]

## 2021-04-02 VITALS
HEART RATE: 92 BPM | DIASTOLIC BLOOD PRESSURE: 77 MMHG | OXYGEN SATURATION: 97 % | TEMPERATURE: 98 F | SYSTOLIC BLOOD PRESSURE: 134 MMHG | RESPIRATION RATE: 18 BRPM

## 2021-04-02 RX ORDER — OXYCODONE AND ACETAMINOPHEN 5; 325 MG/1; MG/1
5-325 TABLET ORAL
Qty: 8 | Refills: 0 | Status: ACTIVE | OUTPATIENT
Start: 2021-04-02

## 2021-12-10 NOTE — PROGRESS NOTE ADULT - PROBLEM SELECTOR PLAN 1
Patient underwent S/p lead extraction and subsequent reimplant ICD for lead fracture  - CXR with pacer/wire in position  - EP plan noted, No AC now  - c/w home meds- DAPT, Coreg, statin  - d/c home today, he will f/u with his Cardiologist in 2 weeks  ** d/c time 37 min
Patient underwent S/p lead extraction and subsequent reimplant ICD for lead fracture  - CXR with pacer/wire in position  - EP plan noted, No AC now  - c/w home meds- DAPT, Coreg, statin
No

## 2023-05-15 NOTE — PROGRESS NOTE ADULT - PROBLEM/PLAN-6
Continue blood pressure medications  Follow up with primary care  Continue fluids and rest  
DISPLAY PLAN FREE TEXT
DISPLAY PLAN FREE TEXT
